# Patient Record
Sex: FEMALE | Race: WHITE | HISPANIC OR LATINO | Employment: FULL TIME | URBAN - METROPOLITAN AREA
[De-identification: names, ages, dates, MRNs, and addresses within clinical notes are randomized per-mention and may not be internally consistent; named-entity substitution may affect disease eponyms.]

---

## 2017-07-05 DIAGNOSIS — F33.1 MAJOR DEPRESSIVE DISORDER, RECURRENT EPISODE, MODERATE (H): Primary | ICD-10-CM

## 2017-07-10 DIAGNOSIS — F33.1 MAJOR DEPRESSIVE DISORDER, RECURRENT EPISODE, MODERATE (H): ICD-10-CM

## 2017-07-10 LAB
ALBUMIN SERPL-MCNC: 3 G/DL (ref 3.4–5)
ALP SERPL-CCNC: 62 U/L (ref 40–150)
ALT SERPL W P-5'-P-CCNC: 20 U/L (ref 0–50)
ANION GAP SERPL CALCULATED.3IONS-SCNC: 8 MMOL/L (ref 3–14)
AST SERPL W P-5'-P-CCNC: 11 U/L (ref 0–45)
BASOPHILS # BLD AUTO: 0 10E9/L (ref 0–0.2)
BASOPHILS NFR BLD AUTO: 0.7 %
BILIRUB SERPL-MCNC: 0.4 MG/DL (ref 0.2–1.3)
BUN SERPL-MCNC: 8 MG/DL (ref 7–30)
CALCIUM SERPL-MCNC: 8.5 MG/DL (ref 8.5–10.1)
CHLORIDE SERPL-SCNC: 107 MMOL/L (ref 94–109)
CHOLEST SERPL-MCNC: 177 MG/DL
CO2 SERPL-SCNC: 24 MMOL/L (ref 20–32)
CREAT SERPL-MCNC: 0.55 MG/DL (ref 0.52–1.04)
DEPRECATED CALCIDIOL+CALCIFEROL SERPL-MC: 16 UG/L (ref 20–75)
DIFFERENTIAL METHOD BLD: NORMAL
EOSINOPHIL # BLD AUTO: 0.1 10E9/L (ref 0–0.7)
EOSINOPHIL NFR BLD AUTO: 2.6 %
ERYTHROCYTE [DISTWIDTH] IN BLOOD BY AUTOMATED COUNT: 13.7 % (ref 10–15)
FERRITIN SERPL-MCNC: 15 NG/ML (ref 12–150)
FOLATE SERPL-MCNC: 18 NG/ML
GFR SERPL CREATININE-BSD FRML MDRD: ABNORMAL ML/MIN/1.7M2
GLUCOSE SERPL-MCNC: 90 MG/DL (ref 70–99)
HBA1C MFR BLD: 5 % (ref 4.3–6)
HCT VFR BLD AUTO: 39.1 % (ref 35–47)
HDLC SERPL-MCNC: 83 MG/DL
HGB BLD-MCNC: 13.2 G/DL (ref 11.7–15.7)
LDLC SERPL CALC-MCNC: 77 MG/DL
LYMPHOCYTES # BLD AUTO: 2.5 10E9/L (ref 0.8–5.3)
LYMPHOCYTES NFR BLD AUTO: 47.2 %
MCH RBC QN AUTO: 28.3 PG (ref 26.5–33)
MCHC RBC AUTO-ENTMCNC: 33.8 G/DL (ref 31.5–36.5)
MCV RBC AUTO: 84 FL (ref 78–100)
MONOCYTES # BLD AUTO: 0.4 10E9/L (ref 0–1.3)
MONOCYTES NFR BLD AUTO: 8.2 %
NEUTROPHILS # BLD AUTO: 2.2 10E9/L (ref 1.6–8.3)
NEUTROPHILS NFR BLD AUTO: 41.3 %
NONHDLC SERPL-MCNC: 94 MG/DL
PLATELET # BLD AUTO: 295 10E9/L (ref 150–450)
POTASSIUM SERPL-SCNC: 4 MMOL/L (ref 3.4–5.3)
PROT SERPL-MCNC: 6.9 G/DL (ref 6.8–8.8)
RBC # BLD AUTO: 4.66 10E12/L (ref 3.8–5.2)
SODIUM SERPL-SCNC: 139 MMOL/L (ref 133–144)
T4 FREE SERPL-MCNC: 1.06 NG/DL (ref 0.76–1.46)
TRIGL SERPL-MCNC: 83 MG/DL
TSH SERPL DL<=0.05 MIU/L-ACNC: 1.79 MU/L (ref 0.4–4)
VIT B12 SERPL-MCNC: 260 PG/ML (ref 193–986)
WBC # BLD AUTO: 5.3 10E9/L (ref 4–11)

## 2017-07-10 PROCEDURE — 84439 ASSAY OF FREE THYROXINE: CPT | Performed by: PSYCHIATRY & NEUROLOGY

## 2017-07-10 PROCEDURE — 83036 HEMOGLOBIN GLYCOSYLATED A1C: CPT | Performed by: PSYCHIATRY & NEUROLOGY

## 2017-07-10 PROCEDURE — 82746 ASSAY OF FOLIC ACID SERUM: CPT | Performed by: PSYCHIATRY & NEUROLOGY

## 2017-07-10 PROCEDURE — 82728 ASSAY OF FERRITIN: CPT | Performed by: PSYCHIATRY & NEUROLOGY

## 2017-07-10 PROCEDURE — 82306 VITAMIN D 25 HYDROXY: CPT | Performed by: PSYCHIATRY & NEUROLOGY

## 2017-07-10 PROCEDURE — 80050 GENERAL HEALTH PANEL: CPT | Performed by: PSYCHIATRY & NEUROLOGY

## 2017-07-10 PROCEDURE — 82607 VITAMIN B-12: CPT | Performed by: PSYCHIATRY & NEUROLOGY

## 2017-07-10 PROCEDURE — 36415 COLL VENOUS BLD VENIPUNCTURE: CPT | Performed by: PSYCHIATRY & NEUROLOGY

## 2017-07-10 PROCEDURE — 80061 LIPID PANEL: CPT | Performed by: PSYCHIATRY & NEUROLOGY

## 2017-07-25 DIAGNOSIS — N92.6 IRREGULAR MENSES: ICD-10-CM

## 2017-07-26 RX ORDER — DROSPIRENONE AND ETHINYL ESTRADIOL TABLETS 0.02-3(28)
KIT ORAL
Qty: 28 TABLET | Refills: 0 | Status: SHIPPED | OUTPATIENT
Start: 2017-07-26 | End: 2017-08-03

## 2017-07-26 NOTE — TELEPHONE ENCOUNTER
LORYNA 3-0.02 MG per tablet  Last Written Prescription Date: 7/15/2016  Last Fill Quantity: 84, # refills: 4  Last Office Visit with G, P or [facility] prescribing provider: 7/15/2016       BP Readings from Last 3 Encounters:   07/15/16 125/80   09/17/10 137/78   06/08/10 145/76     Date of last Breast Exam: patient < 50 years of age.     30 day irineo refill. Called and left patient a message indicating for them to call and schedule an annual office visit. Eileen Hart RN

## 2017-08-03 ENCOUNTER — OFFICE VISIT (OUTPATIENT)
Dept: PEDIATRICS | Facility: CLINIC | Age: 22
End: 2017-08-03
Payer: COMMERCIAL

## 2017-08-03 VITALS
OXYGEN SATURATION: 100 % | WEIGHT: 249.3 LBS | HEART RATE: 95 BPM | BODY MASS INDEX: 41.54 KG/M2 | TEMPERATURE: 98 F | HEIGHT: 65 IN | DIASTOLIC BLOOD PRESSURE: 78 MMHG | SYSTOLIC BLOOD PRESSURE: 132 MMHG

## 2017-08-03 DIAGNOSIS — Z12.4 SCREENING FOR MALIGNANT NEOPLASM OF CERVIX: ICD-10-CM

## 2017-08-03 DIAGNOSIS — N92.6 IRREGULAR MENSES: ICD-10-CM

## 2017-08-03 DIAGNOSIS — E66.9 OBESITY, UNSPECIFIED OBESITY SEVERITY, UNSPECIFIED OBESITY TYPE: ICD-10-CM

## 2017-08-03 DIAGNOSIS — Z00.00 ENCOUNTER FOR ROUTINE ADULT HEALTH EXAMINATION WITHOUT ABNORMAL FINDINGS: Primary | ICD-10-CM

## 2017-08-03 PROCEDURE — G0145 SCR C/V CYTO,THINLAYER,RESCR: HCPCS | Performed by: NURSE PRACTITIONER

## 2017-08-03 PROCEDURE — 99395 PREV VISIT EST AGE 18-39: CPT | Performed by: NURSE PRACTITIONER

## 2017-08-03 RX ORDER — ESCITALOPRAM OXALATE 5 MG/1
5 TABLET ORAL DAILY
COMMUNITY
End: 2018-08-01

## 2017-08-03 RX ORDER — DROSPIRENONE AND ETHINYL ESTRADIOL TABLETS 0.02-3(28)
1 KIT ORAL DAILY
Qty: 84 TABLET | Refills: 4 | Status: SHIPPED | OUTPATIENT
Start: 2017-08-03 | End: 2018-08-19

## 2017-08-03 NOTE — MR AVS SNAPSHOT
After Visit Summary   8/3/2017    Bisi Prasad    MRN: [id number]           Patient Information     Date Of Birth          1995        Visit Information        Provider Department      8/3/2017 4:20 PM Veda Cordoba APRN CNP [facility]        Today's Diagnoses     Encounter for routine adult health examination without abnormal findings        Screening for malignant neoplasm of cervix        Irregular menses          Care Instructions    PLAN:   1.   Symptomatic therapy suggested: Continue current medications.    2.  Orders Placed This Encounter   Medications     escitalopram (LEXAPRO) 5 MG tablet     Sig: Take 5 mg by mouth daily     LORYNA 3-0.02 MG per tablet     Sig: Take 1 tablet by mouth daily     Dispense:  84 tablet     Refill:  4     Orders Placed This Encounter   Procedures     Pap imaged thin layer screen only - recommended age 21 - 24 years       3. Patient needs to follow up in if no improvement,or sooner if worsening of symptoms or other symptoms develop.  Will follow up and/or notify patient of  results via My Chart to determine further need for followup  Follow up office visit in one year for annual health maintenance exam, sooner PRN.    Preventive Health Recommendations  Female Ages 18 to 25     Yearly exam:     See your health care provider every year in order to  o Review health changes.   o Discuss preventive care.    o Review your medicines if your doctor has prescribed any.      You should be tested each year for STDs (sexually transmitted diseases).       After age 20, talk to your provider about how often you should have cholesterol testing.      Starting at age 21, get a Pap test every three years. If you have an abnormal result, your doctor may have you test more often.      If you are at risk for diabetes, you should have a diabetes test (fasting glucose).     Shots:     Get a flu shot each year.     Get a tetanus shot every 10 years.      Consider getting the shot (vaccine) that prevents cervical cancer (Gardasil).    Nutrition:     Eat at least 5 servings of fruits and vegetables each day.    Eat whole-grain bread, whole-wheat pasta and brown rice instead of white grains and rice.    Talk to your provider about Calcium and Vitamin D.     Lifestyle    Exercise at least 150 minutes a week each week (30 minutes a day, 5 days a week). This will help you control your weight and prevent disease.    Limit alcohol to one drink per day.    No smoking.     Wear sunscreen to prevent skin cancer.    See your dentist every six months for an exam and cleaning.  It was a pleasure seeing you today at the [facility]. Thank you for allowing us to care for you today. We truly hope we provided you with the excellent service you deserve. Please let us know if there is anything else we can do for you so we can be sure you are leaving completley satisfied with your care experience.       General information about your clinic   Clinic Hours Lab Hours (Appointments are required)   Mon-Thurs: 7:30 AM - 7 PM Mon-Thurs: 7:30 AM - 7 PM   Fri: 7:30 AM - 5 PM Fri: 7:30 AM - 5 PM        After Hours Nurse Advise & Appts:  [facility] Nurse Advisors: [phone]  [facility] On Call: to make appointments anytime: [phone] On Call Physician: call [phone] and answering service will page the on call physician.        For urgent appointments, please call [phone] and ask for the triage nurse or your care team clinic nurse.  How to contact my care team:  [facility]: [email or web address]   Phone: [phone]   Fax: [phone]       [facility]:   Phone: [phone]  Hours: 8:00 AM - 6:00 PM  Medication Refills:  Call your pharmacy and they will forward the refill to us. Please allow 3 business days for your refills to be completed.       Normal or non-critical lab and imaging results will be communicated to you by MyChart, letter or  phone within 7 days.  If you do not hear from us within 10 days, please call the clinic. If you have a critical or abnormal lab result, we will notify you by phone as soon as possible.       We now have PWIC (Pediatric Walk in Care)  Monday-Friday from 7:30-4. Simply walk in and be seen for your urgent needs like cough, fever, rash, diarrhea or vomiting, pink eye, UTI. No appointments needed. Ask one of the team for more information      -Your Care Team:    Dr. Yuan Bell - Internal Medicine  Dr. Nitin Enriquez - Internal Medicine/Pediatrics   Dr. Azucena Woodson - Family Medicine  Dr. Amanda Whitaker - Pediatrics  Dr. Ana Doan - Pediatrics  Veda Cordoba CNP - Family Practice Nurse Practitioner            Follow-ups after your visit        Who to contact     If you have questions or need follow up information about today's clinic visit or your schedule please contact [facility] directly at [phone].  Normal or non-critical lab and imaging results will be communicated to you by Code71hart, letter or phone within 4 business days after the clinic has received the results. If you do not hear from us within 7 days, please contact the clinic through Code71hart or phone. If you have a critical or abnormal lab result, we will notify you by phone as soon as possible.  Submit refill requests through [employer] or call your pharmacy and they will forward the refill request to us. Please allow 3 business days for your refill to be completed.          Additional Information About Your Visit        Code71hart Information     [employer] gives you secure access to your electronic health record. If you see a primary care provider, you can also send messages to your care team and make appointments. If you have questions, please call your primary care clinic.  If you do not have a primary care provider, please call [phone] and they will assist you.      [employer] is an electronic gateway that provides easy, online access to  "your medical records. With Nutshell, you can request a clinic appointment, read your test results, renew a prescription or communicate with your care team.     To access your existing account, please contact your [facility] or call [phone] for assistance.        Care EveryWhere ID     This is your Care EveryWhere ID. This could be used by other organizations to access your [facility] medical records  [id number]        Your Vitals Were     Pulse Temperature Height Last Period Pulse Oximetry BMI (Body Mass Index)    95 98  F (36.7  C) (Temporal) 5' 4.5\" (1.638 m) 07/16/2017 100% 42.13 kg/m2       Blood Pressure from Last 3 Encounters:   08/03/17 132/78   07/15/16 125/80   09/17/10 137/78    Weight from Last 3 Encounters:   08/03/17 249 lb 4.8 oz (113.1 kg)   07/15/16 264 lb 11.2 oz (120.1 kg)   06/08/10 239 lb 3.2 oz (108.5 kg) (>99 %)*     * Growth percentiles are based on CDC 2-20 Years data.              We Performed the Following     Pap imaged thin layer screen only - recommended age 21 - 24 years          Today's Medication Changes          These changes are accurate as of: 8/3/17  5:01 PM.  If you have any questions, ask your nurse or doctor.               These medicines have changed or have updated prescriptions.        Dose/Directions    LORYNA 3-0.02 MG per tablet   This may have changed:  See the new instructions.   Used for:  Irregular menses   Generic drug:  drospirenone-ethinyl estradiol   Changed by:  Veda Cordoba APRN CNP        Dose:  1 tablet   Take 1 tablet by mouth daily   Quantity:  84 tablet   Refills:  4            Where to get your medicines      These medications were sent to [place], [place] - [facility] [address] [address]     Phone:  [phone]     LORYNA 3-0.02 MG per tablet                Primary Care Provider Office Phone # Fax #    WEI Vazquez [phone] " [phone]       [facility] [address]  [place] [address]        Equal Access to Services     YOKASTA CORRIGAN : Hadii aad ku hadmacario Toro, wacicida luqfelicia, qanuhata kaalmada marissa, susan duranedward robin. So [facility] [phone].    ATENCIÓN: Si habla español, tiene a phan disposición servicios gratuitos de asistencia lingüística. Llame al [phone].    We comply with applicable federal civil rights laws and [place] laws. We do not discriminate on the basis of race, color, national origin, age, disability sex, sexual orientation or gender identity.            Thank you!     Thank you for choosing [facility]  for your care. Our goal is always to provide you with excellent care. Hearing back from our patients is one way we can continue to improve our services. Please take a few minutes to complete the written survey that you may receive in the mail after your visit with us. Thank you!             Your Updated Medication List - Protect others around you: Learn how to safely use, store and throw away your medicines at www.disposemymeds.org.          This list is accurate as of: 8/3/17  5:01 PM.  Always use your most recent med list.                   Brand Name Dispense Instructions for use Diagnosis    escitalopram 5 MG tablet    LEXAPRO     Take 5 mg by mouth daily        LORYNA 3-0.02 MG per tablet   Generic drug:  drospirenone-ethinyl estradiol     84 tablet    Take 1 tablet by mouth daily    Irregular menses

## 2017-08-03 NOTE — PATIENT INSTRUCTIONS
PLAN:   1.   Symptomatic therapy suggested: Continue current medications.    2.  Orders Placed This Encounter   Medications     escitalopram (LEXAPRO) 5 MG tablet     Sig: Take 5 mg by mouth daily     LORYNA 3-0.02 MG per tablet     Sig: Take 1 tablet by mouth daily     Dispense:  84 tablet     Refill:  4     Orders Placed This Encounter   Procedures     Pap imaged thin layer screen only - recommended age 21 - 24 years       3. Patient needs to follow up in if no improvement,or sooner if worsening of symptoms or other symptoms develop.  Will follow up and/or notify patient of  results via My Chart to determine further need for followup  Follow up office visit in one year for annual health maintenance exam, sooner PRN.    Preventive Health Recommendations  Female Ages 18 to 25     Yearly exam:     See your health care provider every year in order to  o Review health changes.   o Discuss preventive care.    o Review your medicines if your doctor has prescribed any.      You should be tested each year for STDs (sexually transmitted diseases).       After age 20, talk to your provider about how often you should have cholesterol testing.      Starting at age 21, get a Pap test every three years. If you have an abnormal result, your doctor may have you test more often.      If you are at risk for diabetes, you should have a diabetes test (fasting glucose).     Shots:     Get a flu shot each year.     Get a tetanus shot every 10 years.     Consider getting the shot (vaccine) that prevents cervical cancer (Gardasil).    Nutrition:     Eat at least 5 servings of fruits and vegetables each day.    Eat whole-grain bread, whole-wheat pasta and brown rice instead of white grains and rice.    Talk to your provider about Calcium and Vitamin D.     Lifestyle    Exercise at least 150 minutes a week each week (30 minutes a day, 5 days a week). This will help you control your weight and prevent disease.    Limit alcohol to one drink  per day.    No smoking.     Wear sunscreen to prevent skin cancer.    See your dentist every six months for an exam and cleaning.  It was a pleasure seeing you today at the [facility]. Thank you for allowing us to care for you today. We truly hope we provided you with the excellent service you deserve. Please let us know if there is anything else we can do for you so we can be sure you are leaving completley satisfied with your care experience.       General information about your clinic   Clinic Hours Lab Hours (Appointments are required)   Mon-Thurs: 7:30 AM - 7 PM Mon-Thurs: 7:30 AM - 7 PM   Fri: 7:30 AM - 5 PM Fri: 7:30 AM - 5 PM        After Hours Nurse Advise & Appts:  [facility] Nurse Advisors: [phone]  [facility] On Call: to make appointments anytime: [phone] On Call Physician: call [phone] and answering service will page the on call physician.        For urgent appointments, please call [phone] and ask for the triage nurse or your care team clinic nurse.  How to contact my care team:  MyChart: [email or web address]   Phone: [phone]   Fax: [phone]       [facility]:   Phone: [phone]  Hours: 8:00 AM - 6:00 PM  Medication Refills:  Call your pharmacy and they will forward the refill to us. Please allow 3 business days for your refills to be completed.       Normal or non-critical lab and imaging results will be communicated to you by MyChart, letter or phone within 7 days.  If you do not hear from us within 10 days, please call the clinic. If you have a critical or abnormal lab result, we will notify you by phone as soon as possible.       We now have PWIC (Pediatric Walk in Care)  Monday-Friday from 7:30-4. Simply walk in and be seen for your urgent needs like cough, fever, rash, diarrhea or vomiting, pink eye, UTI. No appointments needed. Ask one of the team for more information      -Your Care Team:    Dr. Yuan Bell - Internal  Medicine  Dr. Nitin Enriquez - Internal Medicine/Pediatrics   Dr. Azucena Woodson - Family Medicine  Dr. Amanda Whitaker - Pediatrics  Dr. Ana Doan - Pediatrics  Veda Cordoba CNP - Family Practice Nurse Practitioner

## 2017-08-03 NOTE — NURSING NOTE
"Chief Complaint   Patient presents with     Physical       Initial /78  Pulse 95  Temp 98  F (36.7  C) (Temporal)  Ht 5' 4.5\" (1.638 m)  Wt 249 lb 4.8 oz (113.1 kg)  LMP 07/16/2017  SpO2 100%  BMI 42.13 kg/m2 Estimated body mass index is 42.13 kg/(m^2) as calculated from the following:    Height as of this encounter: 5' 4.5\" (1.638 m).    Weight as of this encounter: 249 lb 4.8 oz (113.1 kg).  Medication Reconciliation: complete     Julisa Cherry CMA  "
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
## 2017-08-07 LAB
COPATH REPORT: NORMAL
PAP: NORMAL

## 2018-08-01 ENCOUNTER — OFFICE VISIT (OUTPATIENT)
Dept: FAMILY MEDICINE | Facility: CLINIC | Age: 23
End: 2018-08-01
Payer: COMMERCIAL

## 2018-08-01 VITALS
DIASTOLIC BLOOD PRESSURE: 90 MMHG | OXYGEN SATURATION: 99 % | RESPIRATION RATE: 17 BRPM | SYSTOLIC BLOOD PRESSURE: 137 MMHG | TEMPERATURE: 98.4 F | HEIGHT: 65 IN | BODY MASS INDEX: 44.28 KG/M2 | HEART RATE: 77 BPM | WEIGHT: 265.8 LBS

## 2018-08-01 DIAGNOSIS — E66.01 MORBID OBESITY (H): Primary | ICD-10-CM

## 2018-08-01 DIAGNOSIS — F33.40 RECURRENT MAJOR DEPRESSIVE DISORDER, IN REMISSION (H): ICD-10-CM

## 2018-08-01 DIAGNOSIS — F43.10 PTSD (POST-TRAUMATIC STRESS DISORDER): ICD-10-CM

## 2018-08-01 DIAGNOSIS — F41.1 GAD (GENERALIZED ANXIETY DISORDER): ICD-10-CM

## 2018-08-01 PROCEDURE — 99214 OFFICE O/P EST MOD 30 MIN: CPT | Performed by: FAMILY MEDICINE

## 2018-08-01 RX ORDER — PHENTERMINE HYDROCHLORIDE 15 MG/1
15 CAPSULE ORAL EVERY MORNING
Qty: 30 CAPSULE | Refills: 0 | Status: SHIPPED | OUTPATIENT
Start: 2018-08-01 | End: 2018-10-12

## 2018-08-01 RX ORDER — FAMOTIDINE 20 MG
TABLET ORAL
COMMUNITY

## 2018-08-01 RX ORDER — IBUPROFEN 200 MG
200-400 TABLET ORAL
COMMUNITY
End: 2018-08-01

## 2018-08-01 RX ORDER — BUSPIRONE HYDROCHLORIDE 5 MG/1
TABLET ORAL
Refills: 1 | COMMUNITY
Start: 2018-05-30

## 2018-08-01 RX ORDER — IBUPROFEN 800 MG/1
TABLET, FILM COATED ORAL
Refills: 0 | COMMUNITY
Start: 2018-07-26 | End: 2018-10-12

## 2018-08-01 RX ORDER — ESCITALOPRAM OXALATE 20 MG/1
20 TABLET ORAL DAILY
Refills: 0 | COMMUNITY
Start: 2018-05-02 | End: 2022-01-11

## 2018-08-01 RX ORDER — IBUPROFEN 800 MG/1
800 TABLET, FILM COATED ORAL
COMMUNITY
Start: 2018-04-16 | End: 2018-08-01

## 2018-08-01 RX ORDER — SODIUM FLUORIDE 1.1 G/100G
CREAM ORAL SEE ADMIN INSTRUCTIONS
Refills: 2 | COMMUNITY
Start: 2018-06-05

## 2018-08-01 RX ORDER — CHLORHEXIDINE GLUCONATE ORAL RINSE 1.2 MG/ML
SOLUTION DENTAL
Refills: 0 | COMMUNITY
Start: 2018-07-26 | End: 2018-10-12

## 2018-08-01 NOTE — MR AVS SNAPSHOT
After Visit Summary   8/1/2018    Bisi Prasad    MRN: [id number]           Patient Information     Date Of Birth          1995        Visit Information        Provider Department      8/1/2018 2:00 PM Florecita Silva DO [facility]        Today's Diagnoses     Morbid obesity (H)    -  1    BERTHA (generalized anxiety disorder)        Recurrent major depressive disorder, in remission (H)        PTSD (post-traumatic stress disorder)          Care Instructions    Vyvanse - FDA for binge eating disorder  Stimulant     Contrave - Wellbutrin/naltrexone    Qsymia - Topamax/phentermine    phentermine          Follow-ups after your visit        Future tests that were ordered for you today     Open Future Orders        Priority Expected Expires Ordered    Lipid panel reflex to direct LDL Fasting Routine  12/1/2018 8/1/2018    Tissue transglutaminase cyndy IgA and IgG Routine  12/1/2018 8/1/2018    Vitamin B12 Routine  12/1/2018 8/1/2018    Vitamin D Deficiency Routine  12/1/2018 8/1/2018    Ferritin Routine  12/1/2018 8/1/2018    Comprehensive metabolic panel (BMP + Alb, Alk Phos, ALT, AST, Total. Bili, TP) Routine  12/1/2018 8/1/2018    TSH with free T4 reflex Routine  12/1/2018 8/1/2018            Who to contact     If you have questions or need follow up information about today's clinic visit or your schedule please contact [facility] directly at [phone].  Normal or non-critical lab and imaging results will be communicated to you by MyChart, letter or phone within 4 business days after the clinic has received the results. If you do not hear from us within 7 days, please contact the clinic through MyChart or phone. If you have a critical or abnormal lab result, we will notify you by phone as soon as possible.  Submit refill requests through [employer] or call your pharmacy and they will forward the refill request to us. Please allow 3 business days for your refill to be  "completed.          Additional Information About Your Visit        [employer] Information     [employer] gives you secure access to your electronic health record. If you see a primary care provider, you can also send messages to your care team and make appointments. If you have questions, please call your primary care clinic.  If you do not have a primary care provider, please call [phone] and they will assist you.        Care EveryWhere ID     This is your Care EveryWhere ID. This could be used by other organizations to access your [facility] medical records  [id number]        Your Vitals Were     Pulse Temperature Respirations Height Pulse Oximetry Breastfeeding?    77 98.4  F (36.9  C) (Oral) 17 5' 4.5\" (1.638 m) 99% No    BMI (Body Mass Index)                   44.92 kg/m2            Blood Pressure from Last 3 Encounters:   08/01/18 137/90   08/03/17 132/78   07/15/16 125/80    Weight from Last 3 Encounters:   08/01/18 265 lb 12.8 oz (120.6 kg)   08/03/17 249 lb 4.8 oz (113.1 kg)   07/15/16 264 lb 11.2 oz (120.1 kg)                 Today's Medication Changes          These changes are accurate as of 8/1/18  2:47 PM.  If you have any questions, ask your nurse or doctor.               Start taking these medicines.        Dose/Directions    phentermine 15 MG capsule   Used for:  Morbid obesity (H)   Started by:  Florecita Silva DO        Dose:  15 mg   Take 1 capsule (15 mg) by mouth every morning   Quantity:  30 capsule   Refills:  0            Where to get your medicines      Some of these will need a paper prescription and others can be bought over the counter.  Ask your nurse if you have questions.     Bring a paper prescription for each of these medications     phentermine 15 MG capsule                Primary Care Provider Office Phone # Fax #    Florecita Silva [phone] [address] [address]        Equal Access to Services     YOKASTA CORRIGAN AH: Alexanderii cirilo burden " obi Toro, maribelda lukanuadaha, qanuhata kahenryda marissa, susan duranedward robin. So [facility] [phone].    ATENCIÓN: Si habla amelia, tiene a phan disposición servicios gratuitos de asistencia lingüística. Collin al [phone].    We comply with applicable federal civil rights laws and [place] laws. We do not discriminate on the basis of race, color, national origin, age, disability, sex, sexual orientation, or gender identity.            Thank you!     Thank you for choosing [facility]  for your care. Our goal is always to provide you with excellent care. Hearing back from our patients is one way we can continue to improve our services. Please take a few minutes to complete the written survey that you may receive in the mail after your visit with us. Thank you!             Your Updated Medication List - Protect others around you: Learn how to safely use, store and throw away your medicines at www.disposemymeds.org.          This list is accurate as of 8/1/18  2:47 PM.  Always use your most recent med list.                   Brand Name Dispense Instructions for use Diagnosis    busPIRone 5 MG tablet    BUSPAR     TAKE 1 TABLET BY MOUTH TWICE A DAY        chlorhexidine 0.12 % solution    PERIDEX     RINSE WITH 1/2OZ TWICE DAY FOR 30 SECONDS & EXPECTORATE, AVOID RINSING OR EATING FOR 30 MIN        DENTA 5000 PLUS 1.1 % Crea   Generic drug:  Sodium Fluoride      See Admin Instructions        escitalopram 20 MG tablet    LEXAPRO     Take 20 mg by mouth daily        ibuprofen 800 MG tablet    ADVIL/MOTRIN     TAKE 1 TABLET BY MOUTH EVERY 6 HOURS AS NEEDED FOR PAIN        LORYNA 3-0.02 MG per tablet   Generic drug:  drospirenone-ethinyl estradiol     84 tablet    Take 1 tablet by mouth daily    Irregular menses       phentermine 15 MG capsule     30 capsule    Take 1 capsule (15 mg) by mouth every morning    Morbid obesity (H)       Vitamin D (Cholecalciferol) 1000 units Caps

## 2018-08-01 NOTE — PROGRESS NOTES
SUBJECTIVE:   Bisi Prasad is a 22 year old female who presents to clinic today for the following health issues:  Pt is new to clinic and would like to discuss the following:  Obesity - pt is interested in loosing weight and lowering her cardiac risk factors.  Her biological father had CAD in his 40's.   Recently she has been working out up to 4 times per week  Has been seeing nutritionist - [facility] in [place]  Weight - highest - around 270-280 pounds  Lowest weight was 240 -    When in HS - [employer] kept her very active and didn't worry too much but has always been over weight.   Feels better exercising with mood   history of binging - working with therapist and nutritionist     M aunt with celiac    Pt has a hx of BERTHA and depression   She does see psychiatrist at [facility] - Dr. Medrano    -------------------------------------    Problem list and histories reviewed & adjusted, as indicated.  Additional history: as documented    Patient Active Problem List   Diagnosis     Rosacea     KP (keratosis pilaris)     CARDIOVASCULAR SCREENING; LDL GOAL LESS THAN 130     Obesity, unspecified obesity severity, unspecified obesity type     BERTHA (generalized anxiety disorder)     Recurrent major depressive disorder, in remission (H)     PTSD (post-traumatic stress disorder)     Past Surgical History:   Procedure Laterality Date     NO HISTORY OF SURGERY         Social History   Substance Use Topics     Smoking status: Never Smoker     Smokeless tobacco: Never Used     Alcohol use Yes      Comment: less than once a week      Family History   Problem Relation Age of Onset     Hypertension Mother      Obesity Mother      Cardiovascular Father      Depression Father      Obesity Father      Depression Brother      Depression Sister      Breast Cancer No family hx of      Cancer - colorectal No family hx of      Prostate Cancer No family hx of      Diabetes No family hx of      Coronary Artery  "Disease No family hx of      Hyperlipidemia No family hx of      Cerebrovascular Disease No family hx of      Colon Cancer No family hx of      Thyroid Disease No family hx of      Genetic Disorder No family hx of            Reviewed and updated as needed this visit by clinical staff  Tobacco  Allergies  Meds  Problems  Med Hx  Surg Hx  Fam Hx  Soc Hx        Reviewed and updated as needed this visit by Provider  Allergies  Meds  Problems         ROS:  Constitutional, HEENT, cardiovascular, pulmonary, GI, , musculoskeletal, neuro, skin, endocrine and psych systems are negative, except as otherwise noted.    OBJECTIVE:     /90  Pulse 77  Temp 98.4  F (36.9  C) (Oral)  Resp 17  Ht 5' 4.5\" (1.638 m)  Wt 265 lb 12.8 oz (120.6 kg)  SpO2 99%  Breastfeeding? No  BMI 44.92 kg/m2  Body mass index is 44.92 kg/(m^2).  GENERAL: alert, no distress and obese  RESP: lungs clear to auscultation - no rales, rhonchi or wheezes  CV: regular rate and rhythm, normal S1 S2, no S3 or S4, no murmur, click or rub, no peripheral edema and peripheral pulses strong    Diagnostic Test Results:  Ordered as future - will check fasting labs    ASSESSMENT/PLAN:     1. BERTHA (generalized anxiety disorder)     - Tissue transglutaminase cyndy IgA and IgG; Future  - Vitamin B12; Future  - Vitamin D Deficiency; Future  - Ferritin; Future  - Comprehensive metabolic panel (BMP + Alb, Alk Phos, ALT, AST, Total. Bili, TP); Future  - TSH with free T4 reflex; Future    2. Recurrent major depressive disorder, in remission (H)     - Tissue transglutaminase cyndy IgA and IgG; Future  - Vitamin B12; Future  - Vitamin D Deficiency; Future  - Ferritin; Future  - Comprehensive metabolic panel (BMP + Alb, Alk Phos, ALT, AST, Total. Bili, TP); Future  - TSH with free T4 reflex; Future    3. PTSD (post-traumatic stress disorder)     - Tissue transglutaminase cyndy IgA and IgG; Future  - Vitamin B12; Future  - Vitamin D Deficiency; Future  - Ferritin; " Future  - Comprehensive metabolic panel (BMP + Alb, Alk Phos, ALT, AST, Total. Bili, TP); Future  - TSH with free T4 reflex; Future    4. Morbid obesity (H)  Pt with long hx of morbid obesity -   She does see psychiatry for anxiety and depression  We reviewed all the weight loss drugs availabe and how they work.  She does have hx of binge eating disorder so wonder about Vyvanse.  Could consider this but asked that she review with her psychiatrist since many meds overlap and I don't want to exacerbate her anxiety.  She will try phentermine for one month - very low dose  F/u one month for weight recheck and physical  - Lipid panel reflex to direct LDL Fasting; Future  - Tissue transglutaminase cyndy IgA and IgG; Future  - Vitamin B12; Future  - Vitamin D Deficiency; Future  - Ferritin; Future  - Comprehensive metabolic panel (BMP + Alb, Alk Phos, ALT, AST, Total. Bili, TP); Future  - TSH with free T4 reflex; Future  - phentermine 15 MG capsule; Take 1 capsule (15 mg) by mouth every morning  Dispense: 30 capsule; Refill: 0    I spent over 25 minutes with patient and over 50% time in counseling regarding above issues.     Florecita Silva, DO  [facility]

## 2018-08-01 NOTE — PATIENT INSTRUCTIONS
Vyvanse - FDA for binge eating disorder  Stimulant     Contrave - Wellbutrin/naltrexone    Qsymia - Topamax/phentermine    phentermine

## 2018-08-07 DIAGNOSIS — F41.1 GAD (GENERALIZED ANXIETY DISORDER): ICD-10-CM

## 2018-08-07 DIAGNOSIS — F43.10 PTSD (POST-TRAUMATIC STRESS DISORDER): ICD-10-CM

## 2018-08-07 DIAGNOSIS — E66.01 MORBID OBESITY (H): ICD-10-CM

## 2018-08-07 DIAGNOSIS — F33.40 RECURRENT MAJOR DEPRESSIVE DISORDER, IN REMISSION (H): ICD-10-CM

## 2018-08-07 LAB
ALBUMIN SERPL-MCNC: 3.2 G/DL (ref 3.4–5)
ALP SERPL-CCNC: 56 U/L (ref 40–150)
ALT SERPL W P-5'-P-CCNC: 18 U/L (ref 0–50)
ANION GAP SERPL CALCULATED.3IONS-SCNC: 13 MMOL/L (ref 3–14)
AST SERPL W P-5'-P-CCNC: 12 U/L (ref 0–45)
BILIRUB SERPL-MCNC: 0.3 MG/DL (ref 0.2–1.3)
BUN SERPL-MCNC: 13 MG/DL (ref 7–30)
CALCIUM SERPL-MCNC: 8.4 MG/DL (ref 8.5–10.1)
CHLORIDE SERPL-SCNC: 105 MMOL/L (ref 94–109)
CHOLEST SERPL-MCNC: 176 MG/DL
CO2 SERPL-SCNC: 21 MMOL/L (ref 20–32)
CREAT SERPL-MCNC: 0.6 MG/DL (ref 0.52–1.04)
FERRITIN SERPL-MCNC: 50 NG/ML (ref 12–150)
GFR SERPL CREATININE-BSD FRML MDRD: >90 ML/MIN/1.7M2
GLUCOSE SERPL-MCNC: 90 MG/DL (ref 70–99)
HDLC SERPL-MCNC: 74 MG/DL
LDLC SERPL CALC-MCNC: 72 MG/DL
NONHDLC SERPL-MCNC: 102 MG/DL
POTASSIUM SERPL-SCNC: 3.8 MMOL/L (ref 3.4–5.3)
PROT SERPL-MCNC: 7 G/DL (ref 6.8–8.8)
SODIUM SERPL-SCNC: 139 MMOL/L (ref 133–144)
TRIGL SERPL-MCNC: 148 MG/DL
TSH SERPL DL<=0.005 MIU/L-ACNC: 1.23 MU/L (ref 0.4–4)
VIT B12 SERPL-MCNC: 321 PG/ML (ref 193–986)

## 2018-08-07 PROCEDURE — 80061 LIPID PANEL: CPT | Performed by: FAMILY MEDICINE

## 2018-08-07 PROCEDURE — 82306 VITAMIN D 25 HYDROXY: CPT | Performed by: FAMILY MEDICINE

## 2018-08-07 PROCEDURE — 83516 IMMUNOASSAY NONANTIBODY: CPT | Performed by: FAMILY MEDICINE

## 2018-08-07 PROCEDURE — 80053 COMPREHEN METABOLIC PANEL: CPT | Performed by: FAMILY MEDICINE

## 2018-08-07 PROCEDURE — 82728 ASSAY OF FERRITIN: CPT | Performed by: FAMILY MEDICINE

## 2018-08-07 PROCEDURE — 84443 ASSAY THYROID STIM HORMONE: CPT | Performed by: FAMILY MEDICINE

## 2018-08-07 PROCEDURE — 82607 VITAMIN B-12: CPT | Performed by: FAMILY MEDICINE

## 2018-08-07 PROCEDURE — 36415 COLL VENOUS BLD VENIPUNCTURE: CPT | Performed by: FAMILY MEDICINE

## 2018-08-07 PROCEDURE — 83516 IMMUNOASSAY NONANTIBODY: CPT | Mod: 59 | Performed by: FAMILY MEDICINE

## 2018-08-08 LAB
DEPRECATED CALCIDIOL+CALCIFEROL SERPL-MC: 34 UG/L (ref 20–75)
TTG IGA SER-ACNC: <1 U/ML
TTG IGG SER-ACNC: <1 U/ML

## 2018-08-10 NOTE — PROGRESS NOTES
Deanicole Mathews,   Your test results are all back -   -All of your labs are normal.  Let us know if you have any questions.  -Florecita Silva, DO

## 2018-08-19 DIAGNOSIS — N92.6 IRREGULAR MENSES: ICD-10-CM

## 2018-08-19 NOTE — LETTER
August 20, 2018      Bisi Prasad  [address] [facility] [address]              Dear Bisi,      We recently received a call from your pharmacy requesting a refill of your medication. We have provided a 30 day refill of your medication, LORYNA 3-0.02 MG per tablet, as requested.      A review of your chart indicates that your last office visit was on 8/3/2017 with Veda Cordoba CNP.  An appointment is required yearly with your provider.    We have authorized a one time refill of your medication to allow time for you to schedule.     If you have a history of diabetes or high cholesterol, please come in fasting for the appointment. Fasting entails nothing to eat or drink 8 hours prior to your appointment; with the exception on water. You may take your medication the day of the appointment.    Please call the clinic to schedule your appointment.    Thank you for taking an active role in your healthcare.      Sincerely,    [facility]

## 2018-08-20 RX ORDER — DROSPIRENONE AND ETHINYL ESTRADIOL TABLETS 0.02-3(28)
KIT ORAL
Qty: 28 TABLET | Refills: 0 | Status: SHIPPED | OUTPATIENT
Start: 2018-08-20 | End: 2018-09-15

## 2018-08-20 NOTE — TELEPHONE ENCOUNTER
LORYNA 3-0.02 MG per tablet 84 tablet 4 8/3/2017  Yes   Sig - Route: Take 1 tablet by mouth daily - Oral     Last OV with Veda Cordoba CNP: 8/3/2017    No future apts.     Contraceptives Protocol Passed8/19 1:27 AM   Patient is not a current smoker if age is 35 or older    Recent (12 mo) or future (30 days) visit within the authorizing provider's specialty    No active pregnancy on record    No positive pregnancy test in past 12 months     Patient due for an annual physical. Dorothy refill. Patient notified by letter.     Eileen Hart RN

## 2018-09-15 DIAGNOSIS — N92.6 IRREGULAR MENSES: ICD-10-CM

## 2018-09-17 NOTE — TELEPHONE ENCOUNTER
GIANVI 3-0.02 MG per tablet  LORYNA 3-0.02 MG per tablet 28 tablet 0 8/20/2018  Yes   Sig: TAKE 1 TABLET BY MOUTH EVERY DAY **MUST BE LORYNA**   Class: E-Prescribe   Notes to Pharmacy: Patient due for an annual physical. Dorothy refill. Patient notified by letter.     Routing to patients new PCP Dr. Silva to please review.     Contraceptives Protocol Passed9/15 1:16 AM   Patient is not a current smoker if age is 35 or older    Recent (12 mo) or future (30 days) visit within the authorizing provider's specialty    No active pregnancy on record    No positive pregnancy test in past 12 months     Eileen Hart RN

## 2018-09-18 RX ORDER — DROSPIRENONE AND ETHINYL ESTRADIOL 0.02-3(28)
KIT ORAL
Qty: 84 TABLET | Refills: 0 | Status: SHIPPED | OUTPATIENT
Start: 2018-09-18 | End: 2018-10-31

## 2018-09-20 DIAGNOSIS — N92.6 IRREGULAR MENSES: ICD-10-CM

## 2018-09-20 RX ORDER — DROSPIRENONE AND ETHINYL ESTRADIOL 0.02-3(28)
KIT ORAL
Qty: 28 TABLET | Refills: 0 | OUTPATIENT
Start: 2018-09-20

## 2018-09-20 NOTE — TELEPHONE ENCOUNTER
GIANVI 3-0.02 MG per tablet 84 tablet 0 9/18/2018  Yes   Sig: TAKE 1 TABLET BY MOUTH EVERY DAY   Class: E-Prescribe       Contraceptives Protocol Passed9/20 10:27 AM   Patient is not a current smoker if age is 35 or older    Recent (12 mo) or future (30 days) visit within the authorizing provider's specialty    No active pregnancy on record    No positive pregnancy test in past 12 months     Pharmacy   [facility] [address] [place], [place] - [address] [place] [address]     Our records indicate duplicate request. Same requesting pharmacy. Eileen Hart RN

## 2018-10-01 ENCOUNTER — OFFICE VISIT (OUTPATIENT)
Dept: FAMILY MEDICINE | Facility: CLINIC | Age: 23
End: 2018-10-01
Payer: COMMERCIAL

## 2018-10-01 VITALS
TEMPERATURE: 99 F | HEIGHT: 65 IN | SYSTOLIC BLOOD PRESSURE: 146 MMHG | HEART RATE: 106 BPM | BODY MASS INDEX: 44.85 KG/M2 | OXYGEN SATURATION: 97 % | WEIGHT: 269.2 LBS | DIASTOLIC BLOOD PRESSURE: 101 MMHG

## 2018-10-01 DIAGNOSIS — G44.019 EPISODIC CLUSTER HEADACHE, NOT INTRACTABLE: Primary | ICD-10-CM

## 2018-10-01 DIAGNOSIS — R68.83 CHILLS (WITHOUT FEVER): ICD-10-CM

## 2018-10-01 DIAGNOSIS — R03.0 ELEVATED BLOOD PRESSURE READING WITHOUT DIAGNOSIS OF HYPERTENSION: ICD-10-CM

## 2018-10-01 DIAGNOSIS — E66.01 MORBID OBESITY (H): ICD-10-CM

## 2018-10-01 PROCEDURE — 99214 OFFICE O/P EST MOD 30 MIN: CPT | Performed by: FAMILY MEDICINE

## 2018-10-01 RX ORDER — SUMATRIPTAN 25 MG/1
25-50 TABLET, FILM COATED ORAL
Qty: 18 TABLET | Refills: 1 | Status: SHIPPED | OUTPATIENT
Start: 2018-10-01 | End: 2021-05-18 | Stop reason: DRUGHIGH

## 2018-10-01 NOTE — NURSING NOTE
"Chief Complaint   Patient presents with     Dizziness     Chills     BP (!) 146/101  Pulse 106  Temp 99  F (37.2  C) (Oral)  Ht 5' 4.5\" (1.638 m)  Wt 269 lb 3.2 oz (122.1 kg)  LMP 09/26/2018 (Approximate)  SpO2 97%  BMI 45.49 kg/m2 Estimated body mass index is 45.49 kg/(m^2) as calculated from the following:    Height as of this encounter: 5' 4.5\" (1.638 m).    Weight as of this encounter: 269 lb 3.2 oz (122.1 kg).  Medication Reconciliation: complete      Health Maintenance that is potentially due pending provider review:  Chlamydia screening    Possibly completing today per provider review.    INOCENTE Taylor  "

## 2018-10-01 NOTE — PROGRESS NOTES
"  SUBJECTIVE:   Bisi Prasad is a 23 year old female who presents to clinic today for the following health issues:      Body Chills      Duration: started today    Description (location/character/radiation): feels chills throughout whole body      Intensity:  moderate    Accompanying signs and symptoms: lightheadedness, headache     History (similar episodes/previous evaluation): None    Precipitating or alleviating factors: None    Therapies tried and outcome: None   22 yo who presents to the clinic for evaluation of a sudden onset dizziness,  headache, pressure behind her right eye and nausea without vomiting. Also reports sensitivity to light. She reports that the pain is on the scale of 4/10. Denies any fevers but reports intermittent chills. She denies any weakness. Denies any recent travel. Denies any upper respiratory tract symptoms.     Problem list and histories reviewed & adjusted, as indicated.  Additional history: as documented    Reviewed and updated as needed this visit by clinical staff  Tobacco  Allergies  Meds       Reviewed and updated as needed this visit by Provider         ROS:  Constitutional, HEENT, cardiovascular, pulmonary, gi and gu systems are negative, except as otherwise noted.    OBJECTIVE:     BP (!) 146/101  Pulse 106  Temp 99  F (37.2  C) (Oral)  Ht 5' 4.5\" (1.638 m)  Wt 269 lb 3.2 oz (122.1 kg)  LMP 09/26/2018 (Approximate)  SpO2 97%  BMI 45.49 kg/m2  Body mass index is 45.49 kg/(m^2).  GENERAL: healthy, alert and no distress  EYES: Eyes grossly normal to inspection, PERRL and conjunctivae and sclerae normal  HENT: ear canals and TM's normal, nose and mouth without ulcers or lesions  NECK: no adenopathy, no asymmetry, masses, or scars and thyroid normal to palpation  RESP: lungs clear to auscultation - no rales, rhonchi or wheezes  CV: regular rate and rhythm, normal S1 S2, no S3 or S4, no murmur, click or rub, no peripheral edema and peripheral pulses strong  PSYCH: " mentation appears normal, affect normal/bright    Diagnostic Test Results:  none     ASSESSMENT/PLAN:       ICD-10-CM    1. Episodic cluster headache, not intractable G44.019 SUMAtriptan (IMITREX) 25 MG tablet   2. Chills (without fever) R68.83    3. Elevated blood pressure reading without diagnosis of hypertension R03.0    4. Morbid obesity (H) E66.01      The patient presents to clinic with generalized symptoms. Chills, dizziness, Nausea, sensitivity to light and pain behind right eye. Differential diagnoses are broad. It includes headache syndrome including migraine, cluster headache, viral syndrome, or dehydration. Patient was advised to start with OTC medications for headache relief. She was given a trial of sumatriptan to take if OTC fails to improve. .     Also, the patient is morbidly obese, has elevated blood pressure and has a headache. 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 This is the patient's first visit and it would not be appropriate to reach this diagnosis at this time. Will keep it in our list of differntials.       Chills are not very specific.  Advised to monitor symptoms. Return to clinic as needed.     Also, the patient was noted to have an elevated blood pressure. This is the first elevated blood pressure documented in her chart. Advised to monitor and return to clinic if her blood pressure continues to be elevated.       Omkar Davis MD  [facility]

## 2018-10-01 NOTE — PATIENT INSTRUCTIONS
Cluster Headache  A cluster headache is different from a migraine or a tension headache. A cluster headache starts suddenly, without any warning. The pain can become severe within minutes. The headache is often brief. It can last only 15 minutes. But it can also continue for several hours.  The pain is around a single eye. You may have tears coming from that eye. That eyelid may become droopy. The eye may be red and swollen. You may also have a stuffy or runny nose on the affected side.  You may also have several headaches in one 24-hour period. These may return at the same time of day during the cluster period. A cluster headache ends as suddenly as it began. It often leaves you feeling exhausted for some time afterward.  Cluster headaches often occur at night and during certain times of the year that are unique to you. A cluster period ranges from a few weeks up to a few months. Then, the headaches may not come back for months or years.  Possible triggers include alcohol and smoking. Even a single alcoholic drink can trigger a headache during the cluster period. Another potential trigger is interrupted sleep patterns. Medicines like nitroglycerin can also cause a cluster headache.  Treatment for cluster headaches varies. Headaches that last only 15 minutes aren t helped by over-the-counter medicines. That s because these medicines take 20 to 30 minutes to start working. Prescription medicines given by injection or as a nasal spray can stop a headache. This is called abortive treatment.  Preventive medicines include steroids and anti-seizure medicines. These can help cut the number of headaches you have during a cluster period. High dose oxygen therapy or a nerve stimulator may shorten each attack and make it less severe. If you have cluster headaches often or have severe symptoms, your healthcare provider may talk with you about surgery. Surgery may be able to stop the nerve that s sending the pain  signals. These types of treatments are usually given by a specialist (neurologist or neurosurgeon).  Home care  Follow these tips when caring for yourself at home:    Don t drive yourself home if you were given pain medicine for your headache. Instead, have someone else drive you home. Try to sleep when you get home. You should feel much better when you wake up.    If your healthcare provider gave you preventive medicines, take them as directed. If abortive medicines were prescribed, carry these with you to take at first sign of the headache.    Stick to a regular sleep schedule. Avoid afternoon naps during a cluster period. If you have sleep apnea, talk with your provider about getting treatment for this condition. Sleep apnea greatly interferes with sleep patterns.    Stay away from gas fumes and oil-based paint fumes.    Avoid drinking alcohol and smoking during a cluster period.    Be cautious when traveling to high altitudes. Higher altitudes have less oxygen. This may trigger a headache.    Use sunglasses to avoid glare and bright lights.    Keep a headache journal. Record the date and time of each headache. Describe the quality of the pain. Note how severe it is, where it is, and how long it lasts. Write down your response to any medicines to control the pain. Note possible triggers: Was it something you ate? Something you did just before the attack? Share this information with your provider to help him or her figure out the best treatment plan for you.    Talk with a counselor or therapist, or join a support group. This may help you cope with the effects of cluster headache on your life.  Follow-up care  Follow up with your healthcare provider, or as advised If you had a CT scan or an MRI, a specialist will review it. You will be told of any new findings that may affect your care.  When to seek medical advice  Call your healthcare provider right away if any of these occur:    Sudden, severe headache, worse  than any you have had before    Headache with a fainting spell    Unexplained fever greater than 100.0  F (37.8  C), or as advised    Stiff neck or rash    Weakness in an arm or leg, or on one side of your face    Difficulty speaking    Headaches brought on by physical activity    Changes in your vision    You need to use more pain medicine than normal  Date Last Reviewed: 8/1/2016 2000-2017 [employer]. [address] [place] [address], [place] [address]. All rights reserved. This information is not intended as a substitute for professional medical care. Always follow your healthcare professional's instructions.

## 2018-10-01 NOTE — MR AVS SNAPSHOT
After Visit Summary   10/1/2018    Bisi Prasad    MRN: [id number]           Patient Information     Date Of Birth          1995        Visit Information        Provider Department      10/1/2018 1:40 PM Omkar Davis MD [facility]        Today's Diagnoses     Episodic cluster headache, not intractable    -  1    Morbid obesity (H)          Care Instructions      Cluster Headache  A cluster headache is different from a migraine or a tension headache. A cluster headache starts suddenly, without any warning. The pain can become severe within minutes. The headache is often brief. It can last only 15 minutes. But it can also continue for several hours.  The pain is around a single eye. You may have tears coming from that eye. That eyelid may become droopy. The eye may be red and swollen. You may also have a stuffy or runny nose on the affected side.  You may also have several headaches in one 24-hour period. These may return at the same time of day during the cluster period. A cluster headache ends as suddenly as it began. It often leaves you feeling exhausted for some time afterward.  Cluster headaches often occur at night and during certain times of the year that are unique to you. A cluster period ranges from a few weeks up to a few months. Then, the headaches may not come back for months or years.  Possible triggers include alcohol and smoking. Even a single alcoholic drink can trigger a headache during the cluster period. Another potential trigger is interrupted sleep patterns. Medicines like nitroglycerin can also cause a cluster headache.  Treatment for cluster headaches varies. Headaches that last only 15 minutes aren t helped by over-the-counter medicines. That s because these medicines take 20 to 30 minutes to start working. Prescription medicines given by injection or as a nasal spray can stop a headache. This is called abortive treatment.  Preventive medicines  include steroids and anti-seizure medicines. These can help cut the number of headaches you have during a cluster period. High dose oxygen therapy or a nerve stimulator may shorten each attack and make it less severe. If you have cluster headaches often or have severe symptoms, your healthcare provider may talk with you about surgery. Surgery may be able to stop the nerve that s sending the pain signals. These types of treatments are usually given by a specialist (neurologist or neurosurgeon).  Home care  Follow these tips when caring for yourself at home:    Don t drive yourself home if you were given pain medicine for your headache. Instead, have someone else drive you home. Try to sleep when you get home. You should feel much better when you wake up.    If your healthcare provider gave you preventive medicines, take them as directed. If abortive medicines were prescribed, carry these with you to take at first sign of the headache.    Stick to a regular sleep schedule. Avoid afternoon naps during a cluster period. If you have sleep apnea, talk with your provider about getting treatment for this condition. Sleep apnea greatly interferes with sleep patterns.    Stay away from gas fumes and oil-based paint fumes.    Avoid drinking alcohol and smoking during a cluster period.    Be cautious when traveling to high altitudes. Higher altitudes have less oxygen. This may trigger a headache.    Use sunglasses to avoid glare and bright lights.    Keep a headache journal. Record the date and time of each headache. Describe the quality of the pain. Note how severe it is, where it is, and how long it lasts. Write down your response to any medicines to control the pain. Note possible triggers: Was it something you ate? Something you did just before the attack? Share this information with your provider to help him or her figure out the best treatment plan for you.    Talk with a counselor or therapist, or join a support group.  This may help you cope with the effects of cluster headache on your life.  Follow-up care  Follow up with your healthcare provider, or as advised If you had a CT scan or an MRI, a specialist will review it. You will be told of any new findings that may affect your care.  When to seek medical advice  Call your healthcare provider right away if any of these occur:    Sudden, severe headache, worse than any you have had before    Headache with a fainting spell    Unexplained fever greater than 100.0  F (37.8  C), or as advised    Stiff neck or rash    Weakness in an arm or leg, or on one side of your face    Difficulty speaking    Headaches brought on by physical activity    Changes in your vision    You need to use more pain medicine than normal  Date Last Reviewed: 8/1/2016 2000-2017 [employer]. [address] [place] [address]. All rights reserved. This information is not intended as a substitute for professional medical care. Always follow your healthcare professional's instructions.                Follow-ups after your visit        Your next 10 appointments already scheduled     Oct 12, 2018  3:45 PM BRIT   [facility] with Florecita Silva,    [facility] ([facility])    [address] [place] [address] [phone]              Who to contact     If you have questions or need follow up information about today's clinic visit or your schedule please contact [facility] directly at [phone].  Normal or non-critical lab and imaging results will be communicated to you by MyChart, letter or phone within 4 business days after the clinic has received the results. If you do not hear from us within 7 days, please contact the clinic through [employer] or phone. If you have a critical or abnormal lab result, we will notify you by phone as soon as possible.  Submit refill requests through [employer] or call your pharmacy and they will  "forward the refill request to us. Please allow 3 business days for your refill to be completed.          Additional Information About Your Visit        [email or web address] Information     [employer] gives you secure access to your electronic health record. If you see a primary care provider, you can also send messages to your care team and make appointments. If you have questions, please call your primary care clinic.  If you do not have a primary care provider, please call [phone] and they will assist you.        Care EveryWhere ID     This is your Care EveryWhere ID. This could be used by other organizations to access your [facility] medical records  [id number]        Your Vitals Were     Pulse Temperature Height Last Period Pulse Oximetry BMI (Body Mass Index)    106 99  F (37.2  C) (Oral) 5' 4.5\" (1.638 m) 09/26/2018 (Approximate) 97% 45.49 kg/m2       Blood Pressure from Last 3 Encounters:   10/01/18 (!) 146/101   08/01/18 137/90   08/03/17 132/78    Weight from Last 3 Encounters:   10/01/18 269 lb 3.2 oz (122.1 kg)   08/01/18 265 lb 12.8 oz (120.6 kg)   08/03/17 249 lb 4.8 oz (113.1 kg)              Today, you had the following     No orders found for display         Today's Medication Changes          These changes are accurate as of 10/1/18  2:09 PM.  If you have any questions, ask your nurse or doctor.               Start taking these medicines.        Dose/Directions    SUMAtriptan 25 MG tablet   Commonly known as:  IMITREX   Used for:  Episodic cluster headache, not intractable   Started by:  Omkar Davis MD        Dose:  25-50 mg   Take 1-2 tablets (25-50 mg) by mouth at onset of headache for migraine May repeat in 2 hours. Max 8 tablets/24 hours.   Quantity:  18 tablet   Refills:  1            Where to get your medicines      These medications were sent to [facility] [address] [place], [place] - [address] [address]     Phone:  [phone]     SUMAtriptan 25 MG " tablet                Primary Care Provider Office Phone # Fax #    Florecita Silva [phone] [address] [address]        Equal Access to Services     YOKASTA CORRIGAN : Hadii cirilo ku hadlazaroo Sopaulaali, waaxda luqadaha, qaybta kaalmada adetrevinyada, susan rogersalberta kelly. So [facility] [phone].    ATENCIÓN: Si habla español, tiene a phan disposición servicios gratuitos de asistencia lingüística. Llame al [phone].    We comply with applicable federal civil rights laws and [place] laws. We do not discriminate on the basis of race, color, national origin, age, disability, sex, sexual orientation, or gender identity.            Thank you!     Thank you for choosing [facility]  for your care. Our goal is always to provide you with excellent care. Hearing back from our patients is one way we can continue to improve our services. Please take a few minutes to complete the written survey that you may receive in the mail after your visit with us. Thank you!             Your Updated Medication List - Protect others around you: Learn how to safely use, store and throw away your medicines at www.disposemymeds.org.          This list is accurate as of 10/1/18  2:09 PM.  Always use your most recent med list.                   Brand Name Dispense Instructions for use Diagnosis    busPIRone 5 MG tablet    BUSPAR     TAKE 1 TABLET BY MOUTH TWICE A DAY        chlorhexidine 0.12 % solution    PERIDEX     RINSE WITH 1/2OZ TWICE DAY FOR 30 SECONDS & EXPECTORATE, AVOID RINSING OR EATING FOR 30 MIN        DENTA 5000 PLUS 1.1 % Crea   Generic drug:  Sodium Fluoride      See Admin Instructions        escitalopram 20 MG tablet    LEXAPRO     Take 20 mg by mouth daily        GIANVI 3-0.02 MG per tablet   Generic drug:  drospirenone-ethinyl estradiol     84 tablet    TAKE 1 TABLET BY MOUTH EVERY DAY    Irregular menses       ibuprofen 800 MG tablet    ADVIL/MOTRIN      TAKE 1 TABLET BY MOUTH EVERY 6 HOURS AS NEEDED FOR PAIN        phentermine 15 MG capsule     30 capsule    Take 1 capsule (15 mg) by mouth every morning    Morbid obesity (H)       SUMAtriptan 25 MG tablet    IMITREX    18 tablet    Take 1-2 tablets (25-50 mg) by mouth at onset of headache for migraine May repeat in 2 hours. Max 8 tablets/24 hours.    Episodic cluster headache, not intractable       Vitamin D (Cholecalciferol) 1000 units Caps

## 2018-10-12 ENCOUNTER — OFFICE VISIT (OUTPATIENT)
Dept: FAMILY MEDICINE | Facility: CLINIC | Age: 23
End: 2018-10-12
Payer: COMMERCIAL

## 2018-10-12 VITALS
HEIGHT: 65 IN | TEMPERATURE: 97.9 F | OXYGEN SATURATION: 98 % | BODY MASS INDEX: 43.82 KG/M2 | WEIGHT: 263 LBS | DIASTOLIC BLOOD PRESSURE: 84 MMHG | SYSTOLIC BLOOD PRESSURE: 123 MMHG | HEART RATE: 89 BPM

## 2018-10-12 DIAGNOSIS — Z11.3 SCREEN FOR STD (SEXUALLY TRANSMITTED DISEASE): ICD-10-CM

## 2018-10-12 DIAGNOSIS — Z00.00 ENCOUNTER FOR ROUTINE ADULT HEALTH EXAMINATION WITHOUT ABNORMAL FINDINGS: Primary | ICD-10-CM

## 2018-10-12 PROCEDURE — 90686 IIV4 VACC NO PRSV 0.5 ML IM: CPT | Performed by: FAMILY MEDICINE

## 2018-10-12 PROCEDURE — 87591 N.GONORRHOEAE DNA AMP PROB: CPT | Performed by: FAMILY MEDICINE

## 2018-10-12 PROCEDURE — 87491 CHLMYD TRACH DNA AMP PROBE: CPT | Performed by: FAMILY MEDICINE

## 2018-10-12 PROCEDURE — 99395 PREV VISIT EST AGE 18-39: CPT | Mod: 25 | Performed by: FAMILY MEDICINE

## 2018-10-12 PROCEDURE — 90471 IMMUNIZATION ADMIN: CPT | Performed by: FAMILY MEDICINE

## 2018-10-12 ASSESSMENT — ANXIETY QUESTIONNAIRES
2. NOT BEING ABLE TO STOP OR CONTROL WORRYING: MORE THAN HALF THE DAYS
IF YOU CHECKED OFF ANY PROBLEMS ON THIS QUESTIONNAIRE, HOW DIFFICULT HAVE THESE PROBLEMS MADE IT FOR YOU TO DO YOUR WORK, TAKE CARE OF THINGS AT HOME, OR GET ALONG WITH OTHER PEOPLE: SOMEWHAT DIFFICULT
7. FEELING AFRAID AS IF SOMETHING AWFUL MIGHT HAPPEN: SEVERAL DAYS
6. BECOMING EASILY ANNOYED OR IRRITABLE: SEVERAL DAYS
1. FEELING NERVOUS, ANXIOUS, OR ON EDGE: MORE THAN HALF THE DAYS
3. WORRYING TOO MUCH ABOUT DIFFERENT THINGS: MORE THAN HALF THE DAYS
5. BEING SO RESTLESS THAT IT IS HARD TO SIT STILL: MORE THAN HALF THE DAYS
GAD7 TOTAL SCORE: 11

## 2018-10-12 ASSESSMENT — PATIENT HEALTH QUESTIONNAIRE - PHQ9: 5. POOR APPETITE OR OVEREATING: SEVERAL DAYS

## 2018-10-12 NOTE — MR AVS SNAPSHOT
After Visit Summary   10/12/2018    Bisi Prasad    MRN: [id number]           Patient Information     Date Of Birth          1995        Visit Information        Provider Department      10/12/2018 3:45 PM Florecita Silva DO [facility] [place]        Today's Diagnoses     Encounter for routine adult health examination without abnormal findings    -  1    Screen for STD (sexually transmitted disease)          Care Instructions      Preventive Health Recommendations  Female Ages 21 to 25     Yearly exam:     See your health care provider every year in order to  o Review health changes.   o Discuss preventive care.    o Review your medicines if your doctor has prescribed any.      You should be tested each year for STDs (sexually transmitted diseases).       Talk to your provider about how often you should have cholesterol testing.      Get a Pap test every three years. If you have an abnormal result, your doctor may have you test more often.      If you are at risk for diabetes, you should have a diabetes test (fasting glucose).     Shots:     Get a flu shot each year.     Get a tetanus shot every 10 years.     Consider getting the shot (vaccine) that prevents cervical cancer (Gardasil).    Nutrition:     Eat at least 5 servings of fruits and vegetables each day.    Eat whole-grain bread, whole-wheat pasta and brown rice instead of white grains and rice.    Get adequate Calcium and Vitamin D.     Lifestyle    Exercise at least 150 minutes a week each week (30 minutes a day, 5 days a week). This will help you control your weight and prevent disease.    Limit alcohol to one drink per day.    No smoking.     Wear sunscreen to prevent skin cancer.    See your dentist every six months for an exam and cleaning.          Follow-ups after your visit        Who to contact     If you have questions or need follow up information about today's clinic visit or your schedule please contact [place]  [facility] directly at [phone].  Normal or non-critical lab and imaging results will be communicated to you by MyChart, letter or phone within 4 business days after the clinic has received the results. If you do not hear from us within 7 days, please contact the clinic through [employer] or phone. If you have a critical or abnormal lab result, we will notify you by phone as soon as possible.  Submit refill requests through [employer] or call your pharmacy and they will forward the refill request to us. Please allow 3 business days for your refill to be completed.          Additional Information About Your Visit        [employer] Information     [employer] gives you secure access to your electronic health record. If you see a primary care provider, you can also send messages to your care team and make appointments. If you have questions, please call your primary care clinic.  If you do not have a primary care provider, please call [phone] and they will assist you.        Care EveryWhere ID     This is your Care EveryWhere ID. This could be used by other organizations to access your [place] medical records  [id number]        Your Vitals Were     Pulse Temperature Height Last Period Pulse Oximetry BMI (Body Mass Index)    89 97.9  F (36.6  C) (Oral) 5' 4.5\" (1.638 m) 09/26/2018 (Approximate) 98% 44.45 kg/m2       Blood Pressure from Last 3 Encounters:   10/12/18 123/84   10/01/18 (!) 146/101   08/01/18 137/90    Weight from Last 3 Encounters:   10/12/18 263 lb (119.3 kg)   10/01/18 269 lb 3.2 oz (122.1 kg)   08/01/18 265 lb 12.8 oz (120.6 kg)              We Performed the Following     CHLAMYDIA TRACHOMATIS PCR     FLU VAC PRESRV FREE QUAD SPLIT VIR, IM (3+ YRS)     NEISSERIA GONORRHOEA PCR     VACCINE ADMINISTRATION, INITIAL        Primary Care Provider Office Phone # Fax #    Florecita Silva [phone] [address] [address]        Equal Access to Services     [facility] " GAAR : Hadii aad ku obi Toro, waaxda luqadaha, qaybta kahenryda marissa, susan abdelrahman kandyedward fuller analior fink . So [facility] [phone].    ATENCIÓN: Si habla español, tiene a phan disposición servicios gratuitos de asistencia lingüística. Llame al [phone].    We comply with applicable federal civil rights laws and [place] laws. We do not discriminate on the basis of race, color, national origin, age, disability, sex, sexual orientation, or gender identity.            Thank you!     Thank you for choosing [facility]  for your care. Our goal is always to provide you with excellent care. Hearing back from our patients is one way we can continue to improve our services. Please take a few minutes to complete the written survey that you may receive in the mail after your visit with us. Thank you!             Your Updated Medication List - Protect others around you: Learn how to safely use, store and throw away your medicines at www.disposemymeds.org.          This list is accurate as of 10/12/18  4:38 PM.  Always use your most recent med list.                   Brand Name Dispense Instructions for use Diagnosis    busPIRone 5 MG tablet    BUSPAR     TAKE 1 TABLET BY MOUTH TWICE A DAY        DENTA 5000 PLUS 1.1 % Crea   Generic drug:  Sodium Fluoride      See Admin Instructions        escitalopram 20 MG tablet    LEXAPRO     Take 20 mg by mouth daily        GIANVI 3-0.02 MG per tablet   Generic drug:  drospirenone-ethinyl estradiol     84 tablet    TAKE 1 TABLET BY MOUTH EVERY DAY    Irregular menses       SUMAtriptan 25 MG tablet    IMITREX    18 tablet    Take 1-2 tablets (25-50 mg) by mouth at onset of headache for migraine May repeat in 2 hours. Max 8 tablets/24 hours.    Episodic cluster headache, not intractable       Vitamin D (Cholecalciferol) 1000 units Caps

## 2018-10-12 NOTE — PROGRESS NOTES
SUBJECTIVE:   CC: Bisi Prasad is an 23 year old woman who presents for preventive health visit.     Physical   Annual:     Getting at least 3 servings of Calcium per day:  Yes    Bi-annual eye exam:  Yes    Dental care twice a year:  Yes    Sleep apnea or symptoms of sleep apnea:  None    Diet:  Regular (no restrictions)    Frequency of exercise:  2-3 days/week    Duration of exercise:  15-30 minutes    Taking medications regularly:  No    Barriers to taking medications:  Problems remembering to take them    Additional concerns today:  YES        -------------------------------------    Today's PHQ-2 Score:   PHQ-2 ( 1999 [employer]) 10/12/2018   Q1: Little interest or pleasure in doing things 1   Q2: Feeling down, depressed or hopeless 1   PHQ-2 Score 2   Q1: Little interest or pleasure in doing things Several days   Q2: Feeling down, depressed or hopeless Several days   PHQ-2 Score 2       Abuse: Current or Past(Physical, Sexual or Emotional)- NO  Do you feel safe in your environment - YES    Social History   Substance Use Topics     Smoking status: Never Smoker     Smokeless tobacco: Never Used     Alcohol use Yes      Comment: less than once a week      Alcohol Use 10/12/2018   If you drink alcohol do you typically have greater than 3 drinks per day OR greater than 7 drinks per week? No   No flowsheet data found.    Reviewed orders with patient.  Reviewed health maintenance and updated orders accordingly - Yes  Patient Active Problem List   Diagnosis     Rosacea     KP (keratosis pilaris)     CARDIOVASCULAR SCREENING; LDL GOAL LESS THAN 130     Obesity, unspecified obesity severity, unspecified obesity type     BERTHA (generalized anxiety disorder)     Recurrent major depressive disorder, in remission (H)     PTSD (post-traumatic stress disorder)     Morbid obesity (H)     Past Surgical History:   Procedure Laterality Date     NO HISTORY OF SURGERY         Social History   Substance Use Topics     Smoking  status: Never Smoker     Smokeless tobacco: Never Used     Alcohol use Yes      Comment: less than once a week      Family History   Problem Relation Age of Onset     Hypertension Mother      Obesity Mother      Cardiovascular Father      Depression Father      Obesity Father      Depression Brother      Depression Sister      Breast Cancer No family hx of      Cancer - colorectal No family hx of      Prostate Cancer No family hx of      Diabetes No family hx of      Coronary Artery Disease No family hx of      Hyperlipidemia No family hx of      Cerebrovascular Disease No family hx of      Colon Cancer No family hx of      Thyroid Disease No family hx of      Genetic Disorder No family hx of            Mammogram not appropriate for this patient based on age.    Pertinent mammograms are reviewed under the imaging tab.  History of abnormal Pap smear: NO - age 21-29 PAP every 3 years recommended  PAP / HPV 8/3/2017   PAP NIL     Reviewed and updated as needed this visit by clinical staff  Allergies  Meds         Reviewed and updated as needed this visit by Provider            Review of Systems  CONSTITUTIONAL:NEGATIVE for fever, chills, change in weight  INTEGUMENTARU/SKIN: NEGATIVE for worrisome rashes, moles or lesions  EYES: NEGATIVE for vision changes or irritation  ENT: NEGATIVE for ear, mouth and throat problems  RESP: NEGATIVE for significant cough or SOB  BREAST: NEGATIVE for masses, tenderness or discharge  CV: NEGATIVE for chest pain, palpitations or peripheral edema  GI: NEGATIVE for nausea, abdominal pain, heartburn, or change in bowel habits  : NEGATIVE for unusual urinary or vaginal symptoms. Periods are regular.  MUSCULOSKELETAL: NEGATIVE for significant arthralgias or myalgia  NEURO: NEGATIVE for weakness, dizziness or paresthesias  PSYCHIATRIC: NEGATIVE for changes in mood or affect     OBJECTIVE:   LMP 09/26/2018 (Approximate)  Physical Exam  GENERAL: alert, no distress and obese  EYES: Eyes  "grossly normal to inspection, PERRL and conjunctivae and sclerae normal  HENT: ear canals and TM's normal, nose and mouth without ulcers or lesions  NECK: no adenopathy, no asymmetry, masses, or scars and thyroid normal to palpation  RESP: lungs clear to auscultation - no rales, rhonchi or wheezes  BREAST: no palpable axillary masses or adenopathy and fibrocystic changes left  CV: regular rate and rhythm, normal S1 S2, no S3 or S4, no murmur, click or rub, no peripheral edema and peripheral pulses strong  ABDOMEN: soft, nontender, no hepatosplenomegaly, no masses and bowel sounds normal  MS: no gross musculoskeletal defects noted, no edema  SKIN: no suspicious lesions or rashes  NEURO: Normal strength and tone, mentation intact and speech normal  PSYCH: mentation appears normal, affect normal/bright    Diagnostic Test Results:  pending    ASSESSMENT/PLAN:   1. Encounter for routine adult health examination without abnormal findings  Routine screening  Pap is UTD  - FLU VAC PRESRV FREE QUAD SPLIT VIR, IM (3+ YRS)  - VACCINE ADMINISTRATION, INITIAL    2. Screen for STD (sexually transmitted disease)  Checking STI  - NEISSERIA GONORRHOEA PCR  - CHLAMYDIA TRACHOMATIS PCR    Reviewed the recommendations for IUD    Reviewed the risk/benefits of IUD-including but not limiting to bleeding, infection and uterine perforation.  Discussed the differences between the two types of IUDs - Mirena vs gregor vs Kyleena or paraguard.  Advised to take 3-4 ibuprofen one hour prior to appointment.      COUNSELING:  Reviewed preventive health counseling, as reflected in patient instructions    BP Readings from Last 1 Encounters:   10/01/18 (!) 146/101     Estimated body mass index is 45.49 kg/(m^2) as calculated from the following:    Height as of 10/1/18: 5' 4.5\" (1.638 m).    Weight as of 10/1/18: 269 lb 3.2 oz (122.1 kg).    BP Screening:   Last 3 BP Readings:    BP Readings from Last 3 Encounters:   10/12/18 123/84   10/01/18 (!) " 146/101   08/01/18 137/90       The following was recommended to the patient:  Re-screen BP within a year and recommended lifestyle modifications  Weight management plan: Discussed healthy diet and exercise guidelines and patient will follow up in 12 months in clinic to re-evaluate.     reports that she has never smoked. She has never used smokeless tobacco.      Counseling Resources:  ATP IV Guidelines  Pooled Cohorts Equation Calculator  Breast Cancer Risk Calculator  FRAX Risk Assessment  ICSI Preventive Guidelines  Dietary Guidelines for Americans, 2010  USDA's MyPlate  ASA Prophylaxis  Lung CA Screening    Florecita Silva,   [facility]

## 2018-10-12 NOTE — NURSING NOTE
"Chief Complaint   Patient presents with     Physical     discuss alternate to OCP     /84  Pulse 89  Temp 97.9  F (36.6  C) (Oral)  Ht 5' 4.5\" (1.638 m)  Wt 263 lb (119.3 kg)  LMP 09/26/2018 (Approximate)  SpO2 98%  BMI 44.45 kg/m2 Estimated body mass index is 44.45 kg/(m^2) as calculated from the following:    Height as of this encounter: 5' 4.5\" (1.638 m).    Weight as of this encounter: 263 lb (119.3 kg).        Health Maintenance due pending provider review:  NONE    n/a    Monique Walden CMA  "

## 2018-10-13 ASSESSMENT — ANXIETY QUESTIONNAIRES: GAD7 TOTAL SCORE: 11

## 2018-10-13 ASSESSMENT — PATIENT HEALTH QUESTIONNAIRE - PHQ9: SUM OF ALL RESPONSES TO PHQ QUESTIONS 1-9: 6

## 2018-10-19 LAB
C TRACH DNA SPEC QL NAA+PROBE: NEGATIVE
N GONORRHOEA DNA SPEC QL NAA+PROBE: NEGATIVE
SPECIMEN SOURCE: NORMAL
SPECIMEN SOURCE: NORMAL

## 2018-10-31 ENCOUNTER — OFFICE VISIT (OUTPATIENT)
Dept: FAMILY MEDICINE | Facility: CLINIC | Age: 23
End: 2018-10-31
Payer: COMMERCIAL

## 2018-10-31 VITALS
SYSTOLIC BLOOD PRESSURE: 139 MMHG | OXYGEN SATURATION: 97 % | HEART RATE: 125 BPM | HEIGHT: 65 IN | RESPIRATION RATE: 14 BRPM | BODY MASS INDEX: 43.82 KG/M2 | DIASTOLIC BLOOD PRESSURE: 95 MMHG | TEMPERATURE: 98 F | WEIGHT: 263 LBS

## 2018-10-31 DIAGNOSIS — Z97.5 IUD (INTRAUTERINE DEVICE) IN PLACE: ICD-10-CM

## 2018-10-31 DIAGNOSIS — Z01.812 PRE-PROCEDURE LAB EXAM: ICD-10-CM

## 2018-10-31 DIAGNOSIS — Z30.430 ENCOUNTER FOR IUD INSERTION: Primary | ICD-10-CM

## 2018-10-31 LAB — BETA HCG QUAL IFA URINE: NEGATIVE

## 2018-10-31 PROCEDURE — 58300 INSERT INTRAUTERINE DEVICE: CPT | Performed by: FAMILY MEDICINE

## 2018-10-31 PROCEDURE — 84703 CHORIONIC GONADOTROPIN ASSAY: CPT | Performed by: FAMILY MEDICINE

## 2018-10-31 NOTE — PROGRESS NOTES
Bisi Prasad is a 23 year old female who presents today requesting placement of an Mirena iud.    The patient meets and is agreeable to the following conditions:    She is not interested in conception in the near future.   She currently is in a stable, monogamous relationship.   There is no previous history of pelvic inflammatory disease.   There is no previous history of ectopic pregnancy.   She is willing to check monthly for the IUD string.   She is at least 8 weeks post-partum.   There is no history of unresolved abnormal uterine bleeding.   There is no history of an unresolved abnormal PAP smear.   She has no history of Michael's disease or an allergy to copper (for ParaGard).   She has no history of diabetes, AIDS, leukemia, IV drug use or chronic steroid use.   She is willing to return annually for PAP smears.   She has had a PAP smear within the past 6 months.   She denies the possibility of pregnancy.   Pregnancy test today is negative.     The following risks were discussed with the patient:  Possibility of pregnancy and ectopic pregnancy.   Possibility of pelvic inflammatory disease, particularly with new partners.   Risk of uterine perforation or IUD expulsion.   Possibility of difficult removal.   Spotting or heavy bleeding.   Cramping, pain or infection during or after insertion.     The patient was given patient information on the IUD and the patient education brochure from the .   The patient has given consent to proceed with placement of the IUD.  A written consent form is present in the chart.   She wishes to proceed.    PROCEDURE:    Type of IUD: Mirena    The patient has taken 600-800mg of tylenol prior to the procedure. She is placed in a dorsal lithotomy potion and a pelvic exam is performed to determine the position of the uterus.  The cervix is identified and cleaned with betadine three times.  A single tooth tenaculum is applied to the anterior lip of the cervix for  stabilization.  The uterus is sounded to 7.5 cm and removed. (Target sound depth is 6.5 cm to 8.5 cm.)  The IUD insertion tube is prepared to manufacturers recommendations and inserted into the uterus under sterile conditions to the sounding depth.   The arms of the IUD are then opened by withdrawing the insertion tube 2.0 cm while stabilizing the solid insertion elisa without difficulty.  The IUD string is then cut to 2.5 cm.    The patient tolerated this procedure without immediate complication.  The patient is to return or call immediately for any unexplained fever, abdominal or pelvic pain, excessive bleeding, possibility of pregnancy, foul-smelling discharge, sense that the IUD has been expelled.    Florecita Silva

## 2018-10-31 NOTE — NURSING NOTE
"Chief Complaint   Patient presents with     IUD     placement       Initial BP (!) 139/95  Pulse 125  Temp 98  F (36.7  C) (Oral)  Resp 14  Ht 5' 4.5\" (1.638 m)  Wt 263 lb (119.3 kg)  LMP 10/22/2018 (Exact Date)  SpO2 97%  Breastfeeding? No  BMI 44.45 kg/m2 Estimated body mass index is 44.45 kg/(m^2) as calculated from the following:    Height as of this encounter: 5' 4.5\" (1.638 m).    Weight as of this encounter: 263 lb (119.3 kg).  BP completed using cuff size: large    Health Maintenance that is potentially due pending provider review:  Health Maintenance Due   Topic Date Due     HIV SCREEN (SYSTEM ASSIGNED)  08/14/2013         Per provider  "

## 2018-10-31 NOTE — MR AVS SNAPSHOT
"              After Visit Summary   10/31/2018    Bisi Prasad    MRN: [id number]           Patient Information     Date Of Birth          1995        Visit Information        Provider Department      10/31/2018 10:30 AM Florecita Silva DO; UP PROC RM 1 [facility]        Today's Diagnoses     Encounter for IUD insertion    -  1    Pre-procedure lab exam        IUD (intrauterine device) in place           Follow-ups after your visit        Who to contact     If you have questions or need follow up information about today's clinic visit or your schedule please contact [facility] directly at [phone].  Normal or non-critical lab and imaging results will be communicated to you by MyChart, letter or phone within 4 business days after the clinic has received the results. If you do not hear from us within 7 days, please contact the clinic through [employer] or phone. If you have a critical or abnormal lab result, we will notify you by phone as soon as possible.  Submit refill requests through [employer] or call your pharmacy and they will forward the refill request to us. Please allow 3 business days for your refill to be completed.          Additional Information About Your Visit        MyChart Information     [employer] gives you secure access to your electronic health record. If you see a primary care provider, you can also send messages to your care team and make appointments. If you have questions, please call your primary care clinic.  If you do not have a primary care provider, please call [phone] and they will assist you.        Care EveryWhere ID     This is your Care EveryWhere ID. This could be used by other organizations to access your [facility] medical records  [id number]        Your Vitals Were     Pulse Temperature Respirations Height Last Period Pulse Oximetry    125 98  F (36.7  C) (Oral) 14 5' 4.5\" (1.638 m) 10/22/2018 (Exact Date) 97%    Breastfeeding? BMI (Body Mass " Index)                No 44.45 kg/m2           Blood Pressure from Last 3 Encounters:   10/31/18 (!) 139/95   10/12/18 123/84   10/01/18 (!) 146/101    Weight from Last 3 Encounters:   10/31/18 263 lb (119.3 kg)   10/12/18 263 lb (119.3 kg)   10/01/18 269 lb 3.2 oz (122.1 kg)              We Performed the Following     Beta HCG Qual, Urine - [facility] and [place] ([id number])     HC LEVONORGESTREL IU 52MG 5 YR     INSERTION INTRAUTERINE DEVICE          Today's Medication Changes          These changes are accurate as of 10/31/18  1:21 PM.  If you have any questions, ask your nurse or doctor.               Start taking these medicines.        Dose/Directions    levonorgestrel 20 MCG/24HR IUD   Commonly known as:  MIRENA   Used for:  Encounter for IUD insertion   Started by:  Florecita Silva DO        Dose:  1 each   1 each (20 mcg) by Intrauterine route once for 1 dose Lot # [id number]   Quantity:  1 each   Refills:  0            Where to get your medicines      Some of these will need a paper prescription and others can be bought over the counter.  Ask your nurse if you have questions.     You don't need a prescription for these medications     levonorgestrel 20 MCG/24HR IUD                Primary Care Provider Office Phone # Fax #    Florecita Silva [phone] [address] [address]        Equal Access to Services     [facility]: Hadii aad ku hadasho Soomaali, waaxda luqadaha, qaybta kaalmada adeegyada, susan kelly. So [facility] [phone].    ATENCIÓN: Si habla español, tiene a phan disposición servicios gratuitos de asistencia lingüística. Llame al [phone].    We comply with applicable federal civil rights laws and [place] laws. We do not discriminate on the basis of race, color, national origin, age, disability, sex, sexual orientation, or gender identity.            Thank you!     Thank you for choosing [facility]  for your  care. Our goal is always to provide you with excellent care. Hearing back from our patients is one way we can continue to improve our services. Please take a few minutes to complete the written survey that you may receive in the mail after your visit with us. Thank you!             Your Updated Medication List - Protect others around you: Learn how to safely use, store and throw away your medicines at www.disposemymeds.org.          This list is accurate as of 10/31/18  1:21 PM.  Always use your most recent med list.                   Brand Name Dispense Instructions for use Diagnosis    busPIRone 5 MG tablet    BUSPAR     TAKE 1 TABLET BY MOUTH TWICE A DAY        DENTA 5000 PLUS 1.1 % Crea   Generic drug:  Sodium Fluoride      See Admin Instructions        escitalopram 20 MG tablet    LEXAPRO     Take 20 mg by mouth daily        levonorgestrel 20 MCG/24HR IUD    MIRENA    1 each    1 each (20 mcg) by Intrauterine route once for 1 dose Lot # [id number]    Encounter for IUD insertion       SUMAtriptan 25 MG tablet    IMITREX    18 tablet    Take 1-2 tablets (25-50 mg) by mouth at onset of headache for migraine May repeat in 2 hours. Max 8 tablets/24 hours.    Episodic cluster headache, not intractable       Vitamin D (Cholecalciferol) 1000 units Caps

## 2018-11-30 ENCOUNTER — OFFICE VISIT (OUTPATIENT)
Dept: FAMILY MEDICINE | Facility: CLINIC | Age: 23
End: 2018-11-30
Payer: COMMERCIAL

## 2018-11-30 VITALS
HEIGHT: 65 IN | SYSTOLIC BLOOD PRESSURE: 136 MMHG | BODY MASS INDEX: 46.22 KG/M2 | WEIGHT: 277.4 LBS | DIASTOLIC BLOOD PRESSURE: 91 MMHG | OXYGEN SATURATION: 100 % | TEMPERATURE: 97.9 F | HEART RATE: 79 BPM

## 2018-11-30 DIAGNOSIS — Z97.5 IUD (INTRAUTERINE DEVICE) IN PLACE: Primary | ICD-10-CM

## 2018-11-30 DIAGNOSIS — I10 BENIGN ESSENTIAL HYPERTENSION: ICD-10-CM

## 2018-11-30 PROCEDURE — 99213 OFFICE O/P EST LOW 20 MIN: CPT | Performed by: FAMILY MEDICINE

## 2018-11-30 NOTE — PROGRESS NOTES
SUBJECTIVE:   Bisi Prasad is a 23 year old female who presents to clinic today for the following health issues:      Chief Complaint   Patient presents with     RECHECK     f/u after IUD insertion, no issues     No concerns -   Mild spotting after placed  None since that time  No discharge  No pelvic pain    -------------------------------------    Problem list and histories reviewed & adjusted, as indicated.  Additional history: as documented    Patient Active Problem List   Diagnosis     Rosacea     KP (keratosis pilaris)     CARDIOVASCULAR SCREENING; LDL GOAL LESS THAN 130     Obesity, unspecified obesity severity, unspecified obesity type     BERTHA (generalized anxiety disorder)     Recurrent major depressive disorder, in remission (H)     PTSD (post-traumatic stress disorder)     Morbid obesity (H)     IUD (intrauterine device) in place     Past Surgical History:   Procedure Laterality Date     NO HISTORY OF SURGERY         Social History   Substance Use Topics     Smoking status: Never Smoker     Smokeless tobacco: Never Used     Alcohol use Yes      Comment: less than once a week      Family History   Problem Relation Age of Onset     Hypertension Mother      Obesity Mother      Cardiovascular Father      Depression Father      Obesity Father      Depression Brother      Depression Sister      Breast Cancer No family hx of      Cancer - colorectal No family hx of      Prostate Cancer No family hx of      Diabetes No family hx of      Coronary Artery Disease No family hx of      Hyperlipidemia No family hx of      Cerebrovascular Disease No family hx of      Colon Cancer No family hx of      Thyroid Disease No family hx of      Genetic Disorder No family hx of            Reviewed and updated as needed this visit by clinical staff  Tobacco  Allergies  Meds  Problems  Med Hx  Surg Hx  Fam Hx  Soc Hx        Reviewed and updated as needed this visit by Provider  Allergies  Meds  Problems      "    ROS:  Constitutional, HEENT, cardiovascular, pulmonary, gi and gu systems are negative, except as otherwise noted.    OBJECTIVE:     BP (!) 136/91  Pulse 79  Temp 97.9  F (36.6  C) (Oral)  Ht 5' 4.5\" (1.638 m)  Wt 277 lb 6.4 oz (125.8 kg)  SpO2 100%  BMI 46.88 kg/m2  Body mass index is 46.88 kg/(m^2).  GENERAL: alert and no distress  Gyn - normal exam, normal cervix with IUD in place -       Diagnostic Test Results:  none     ASSESSMENT/PLAN:     1. IUD (intrauterine device) in place  IUD in place - mirena   Advised any future warning signs and symptoms    2.  HTN -   Discussed BP reading today is just borderline high   She does have home cuff and will continue to check at home  Discussed if readings continue to stay high would consider starting BP med  Gave BP card to keep track    Pt will call or RTC if symptoms worsen or do not improve.      Florecita Silva, DO  [facility]    "

## 2018-11-30 NOTE — NURSING NOTE
"Chief Complaint   Patient presents with     RECHECK     f/u after IUD insertion, no issues     BP (!) 136/91  Pulse 79  Temp 97.9  F (36.6  C) (Oral)  Ht 5' 4.5\" (1.638 m)  Wt 277 lb 6.4 oz (125.8 kg)  SpO2 100%  BMI 46.88 kg/m2 Estimated body mass index is 46.88 kg/(m^2) as calculated from the following:    Height as of this encounter: 5' 4.5\" (1.638 m).    Weight as of this encounter: 277 lb 6.4 oz (125.8 kg).            Monique Walden CMA  "

## 2018-11-30 NOTE — MR AVS SNAPSHOT
"              After Visit Summary   11/30/2018    Bisi Prasad    MRN: [id number]           Patient Information     Date Of Birth          1995        Visit Information        Provider Department      11/30/2018 2:15 PM Florecita Silva, DO [facility]        Today's Diagnoses     IUD (intrauterine device) in place    -  1       Follow-ups after your visit        Follow-up notes from your care team     Return in about 10 months (around 9/30/2019) for Physical Exam - Due October.      Who to contact     If you have questions or need follow up information about today's clinic visit or your schedule please contact [facility] directly at [phone].  Normal or non-critical lab and imaging results will be communicated to you by [employer], letter or phone within 4 business days after the clinic has received the results. If you do not hear from us within 7 days, please contact the clinic through [employer] or phone. If you have a critical or abnormal lab result, we will notify you by phone as soon as possible.  Submit refill requests through [employer] or call your pharmacy and they will forward the refill request to us. Please allow 3 business days for your refill to be completed.          Additional Information About Your Visit        MyChart Information     [employer] gives you secure access to your electronic health record. If you see a primary care provider, you can also send messages to your care team and make appointments. If you have questions, please call your primary care clinic.  If you do not have a primary care provider, please call [phone] and they will assist you.        Care EveryWhere ID     This is your Care EveryWhere ID. This could be used by other organizations to access your [place] medical records  [id number]        Your Vitals Were     Pulse Temperature Height Pulse Oximetry BMI (Body Mass Index)       79 97.9  F (36.6  C) (Oral) 5' 4.5\" (1.638 m) 100% 46.88 kg/m2     "    Blood Pressure from Last 3 Encounters:   11/30/18 (!) 136/91   10/31/18 (!) 139/95   10/12/18 123/84    Weight from Last 3 Encounters:   11/30/18 277 lb 6.4 oz (125.8 kg)   10/31/18 263 lb (119.3 kg)   10/12/18 263 lb (119.3 kg)              Today, you had the following     No orders found for display       Primary Care Provider Office Phone # Fax #    Florecita Silva  [phone]       [address] [address]        Equal Access to Services     [facility]: Hadii aad ku hadasho Soomaali, waaxda luqadaha, qaybta kaalmada adeegyada, susan rodriguez adetrevin fink . So [facility] [phone].    ATENCIÓN: Si habla español, tiene a phan disposición servicios gratuitos de asistencia lingüística. Llame al [phone].    We comply with applicable federal civil rights laws and [place] laws. We do not discriminate on the basis of race, color, national origin, age, disability, sex, sexual orientation, or gender identity.            Thank you!     Thank you for choosing [facility]  for your care. Our goal is always to provide you with excellent care. Hearing back from our patients is one way we can continue to improve our services. Please take a few minutes to complete the written survey that you may receive in the mail after your visit with us. Thank you!             Your Updated Medication List - Protect others around you: Learn how to safely use, store and throw away your medicines at www.disposemymeds.org.          This list is accurate as of 11/30/18  2:35 PM.  Always use your most recent med list.                   Brand Name Dispense Instructions for use Diagnosis    busPIRone 5 MG tablet    BUSPAR     TAKE 1 TABLET BY MOUTH TWICE A DAY        DENTA 5000 PLUS 1.1 % Crea   Generic drug:  Sodium Fluoride      See Admin Instructions        escitalopram 20 MG tablet    LEXAPRO     Take 20 mg by mouth daily        levonorgestrel 20 MCG/24HR IUD    MIRENA    1  each    1 each (20 mcg) by Intrauterine route once for 1 dose Lot # [id number]    Encounter for IUD insertion       SUMAtriptan 25 MG tablet    IMITREX    18 tablet    Take 1-2 tablets (25-50 mg) by mouth at onset of headache for migraine May repeat in 2 hours. Max 8 tablets/24 hours.    Episodic cluster headache, not intractable       Vitamin D (Cholecalciferol) 1000 units Caps

## 2019-03-13 ENCOUNTER — OFFICE VISIT (OUTPATIENT)
Dept: FAMILY MEDICINE | Facility: CLINIC | Age: 24
End: 2019-03-13
Payer: COMMERCIAL

## 2019-03-13 VITALS
DIASTOLIC BLOOD PRESSURE: 90 MMHG | OXYGEN SATURATION: 98 % | HEART RATE: 90 BPM | TEMPERATURE: 98.3 F | SYSTOLIC BLOOD PRESSURE: 137 MMHG | HEIGHT: 65 IN | WEIGHT: 284.1 LBS | BODY MASS INDEX: 47.34 KG/M2

## 2019-03-13 DIAGNOSIS — F41.1 GAD (GENERALIZED ANXIETY DISORDER): ICD-10-CM

## 2019-03-13 DIAGNOSIS — E66.01 MORBID OBESITY (H): ICD-10-CM

## 2019-03-13 DIAGNOSIS — I10 ESSENTIAL HYPERTENSION: Primary | ICD-10-CM

## 2019-03-13 PROCEDURE — 99214 OFFICE O/P EST MOD 30 MIN: CPT | Performed by: FAMILY MEDICINE

## 2019-03-13 RX ORDER — BUPROPION HYDROCHLORIDE 150 MG/1
TABLET ORAL
Refills: 2 | COMMUNITY
Start: 2019-02-27

## 2019-03-13 RX ORDER — METOPROLOL SUCCINATE 50 MG/1
50 TABLET, EXTENDED RELEASE ORAL DAILY
Qty: 30 TABLET | Refills: 1 | Status: SHIPPED | OUTPATIENT
Start: 2019-03-13 | End: 2019-04-16

## 2019-03-13 ASSESSMENT — MIFFLIN-ST. JEOR: SCORE: 2036.61

## 2019-03-13 NOTE — PROGRESS NOTES
"  SUBJECTIVE:   Bisi Prasad is a 23 year old female who presents to clinic today for the following health issues:      Hypertension Follow-up      Outpatient blood pressures are being checked at home.  Results are averaging 140s/90s.    Low Salt Diet: no added salt      Amount of exercise or physical activity: 2-3 days/week for an average of 30-60 minutes    Problems taking medications regularly: No    Medication side effects: started wellbutrin which caused high anxiety at first and could be affecting BP    Diet: regular - less sugar intake and calorie counting     Family hx of HTN   Biological father passed away from CAD late 50's to early 60's    ?dizzienss - unclear if related or if side effect of medications  Some anxious     Obesity -   Had been working on diet and exercise but slipped and gained some weight   Just in the past two weeks started new program and lost 6 pounds   NO[facility] is the program and the work on  psychology of eating -   Has \"health \"      Anxiety -  Has been worse and has been seeing her psychiatrist -   She wondered if med could help both anxiety and her HTN  Readings high at psychiatrist office    -------------------------------------    Problem list and histories reviewed & adjusted, as indicated.  Additional history: as documented    Patient Active Problem List   Diagnosis     Rosacea     KP (keratosis pilaris)     CARDIOVASCULAR SCREENING; LDL GOAL LESS THAN 130     Obesity, unspecified obesity severity, unspecified obesity type     BERTHA (generalized anxiety disorder)     Recurrent major depressive disorder, in remission (H)     PTSD (post-traumatic stress disorder)     Morbid obesity (H)     IUD (intrauterine device) in place     Benign essential hypertension     Past Surgical History:   Procedure Laterality Date     NO HISTORY OF SURGERY         Social History     Tobacco Use     Smoking status: Never Smoker     Smokeless tobacco: Never Used   Substance Use Topics     Alcohol " "use: Yes     Comment: less than once a week      Family History   Problem Relation Age of Onset     Hypertension Mother      Obesity Mother      Cardiovascular Father      Depression Father      Obesity Father      Depression Brother      Depression Sister      Breast Cancer No family hx of      Cancer - colorectal No family hx of      Prostate Cancer No family hx of      Diabetes No family hx of      Coronary Artery Disease No family hx of      Hyperlipidemia No family hx of      Cerebrovascular Disease No family hx of      Colon Cancer No family hx of      Thyroid Disease No family hx of      Genetic Disorder No family hx of            Reviewed and updated as needed this visit by clinical staff  Tobacco  Allergies  Meds  Problems  Med Hx  Surg Hx  Fam Hx       Reviewed and updated as needed this visit by Provider  Tobacco  Allergies  Meds  Problems  Med Hx  Surg Hx  Fam Hx         ROS:  Constitutional, HEENT, cardiovascular, pulmonary, GI, , musculoskeletal, neuro, skin, endocrine and psych systems are negative, except as otherwise noted.    OBJECTIVE:     /90   Pulse 90   Temp 98.3  F (36.8  C) (Oral)   Ht 1.638 m (5' 4.5\")   Wt 128.9 kg (284 lb 1.6 oz)   SpO2 98%   BMI 48.01 kg/m    Body mass index is 48.01 kg/m .  GENERAL: alert and no distress  CV: regular rate and rhythm, normal S1 S2, no S3 or S4, no murmur, click or rub, no peripheral edema and peripheral pulses strong    Diagnostic Test Results:  none     ASSESSMENT/PLAN:     1. Essential hypertension  BP - new diagnosis  Discussed options of hydrochlorothiazide vs beta blocker  Due to her hx of anxiety will start with the toprol   F/u one month  Monitor BP  - metoprolol succinate ER (TOPROL-XL) 50 MG 24 hr tablet; Take 1 tablet (50 mg) by mouth daily  Dispense: 30 tablet; Refill: 1    2. BERTHA (generalized anxiety disorder)  Follows with psychiatrist but will check labs at next visit including CMP, ferritin and B12    3. Morbid " obesity (H)  Working on diet and exercise  Started new program [facility]      F/u one month    Pt will call or RTC if symptoms worsen or do not improve.      Florecita Silva,   [facility]

## 2019-03-13 NOTE — NURSING NOTE
"Chief Complaint   Patient presents with     Hypertension     /90   Pulse 90   Temp 98.3  F (36.8  C) (Oral)   Ht 1.638 m (5' 4.5\")   Wt 128.9 kg (284 lb 1.6 oz)   SpO2 98%   BMI 48.01 kg/m   Estimated body mass index is 48.01 kg/m  as calculated from the following:    Height as of this encounter: 1.638 m (5' 4.5\").    Weight as of this encounter: 128.9 kg (284 lb 1.6 oz).  Medication Reconciliation: complete      Health Maintenance that is potentially due pending provider review:  NONE    n/a    INOCENTE Taylor  "

## 2019-03-18 ENCOUNTER — TELEPHONE (OUTPATIENT)
Dept: FAMILY MEDICINE | Facility: CLINIC | Age: 24
End: 2019-03-18

## 2019-03-18 DIAGNOSIS — I10 BENIGN ESSENTIAL HYPERTENSION: ICD-10-CM

## 2019-03-18 DIAGNOSIS — F33.40 RECURRENT MAJOR DEPRESSIVE DISORDER, IN REMISSION (H): ICD-10-CM

## 2019-03-18 DIAGNOSIS — F41.1 GAD (GENERALIZED ANXIETY DISORDER): Primary | ICD-10-CM

## 2019-03-18 DIAGNOSIS — E66.9 OBESITY, UNSPECIFIED OBESITY SEVERITY, UNSPECIFIED OBESITY TYPE: ICD-10-CM

## 2019-03-18 NOTE — TELEPHONE ENCOUNTER
PN,   See below lab order request from pt  Recently saw you 3/13/2019  Pended all labs pt requesting   Please advise  Thanks,  Tavia STEVENS RN

## 2019-03-18 NOTE — TELEPHONE ENCOUNTER
----- Message -----   From: Bisi Prasad   Sent: 3/18/2019  11:12 AM   To: Up Reception   Subject: Appointment scheduled from [facility]                 Appointment For: Bisi Prasad ([id number])   Visit Type: [facility] LAB VISIT ([id number])      4/10/2019    7:30 AM  15 mins.  UP LAB                    UP LAB      Patient Comments:   Blood panel for iron, vitamin D levels, as well as cholesterol, check thyroid, full panel

## 2019-04-10 DIAGNOSIS — F33.40 RECURRENT MAJOR DEPRESSIVE DISORDER, IN REMISSION (H): ICD-10-CM

## 2019-04-10 DIAGNOSIS — E66.9 OBESITY, UNSPECIFIED OBESITY SEVERITY, UNSPECIFIED OBESITY TYPE: ICD-10-CM

## 2019-04-10 DIAGNOSIS — F41.1 GAD (GENERALIZED ANXIETY DISORDER): ICD-10-CM

## 2019-04-10 DIAGNOSIS — I10 BENIGN ESSENTIAL HYPERTENSION: ICD-10-CM

## 2019-04-10 LAB
ALBUMIN SERPL-MCNC: 3.8 G/DL (ref 3.4–5)
ALP SERPL-CCNC: 70 U/L (ref 40–150)
ALT SERPL W P-5'-P-CCNC: 26 U/L (ref 0–50)
ANION GAP SERPL CALCULATED.3IONS-SCNC: 8 MMOL/L (ref 3–14)
AST SERPL W P-5'-P-CCNC: 17 U/L (ref 0–45)
BILIRUB SERPL-MCNC: 0.5 MG/DL (ref 0.2–1.3)
BUN SERPL-MCNC: 11 MG/DL (ref 7–30)
CALCIUM SERPL-MCNC: 9.2 MG/DL (ref 8.5–10.1)
CHLORIDE SERPL-SCNC: 108 MMOL/L (ref 94–109)
CHOLEST SERPL-MCNC: 165 MG/DL
CO2 SERPL-SCNC: 24 MMOL/L (ref 20–32)
CREAT SERPL-MCNC: 0.69 MG/DL (ref 0.52–1.04)
DEPRECATED CALCIDIOL+CALCIFEROL SERPL-MC: 27 UG/L (ref 20–75)
FERRITIN SERPL-MCNC: 27 NG/ML (ref 12–150)
GFR SERPL CREATININE-BSD FRML MDRD: >90 ML/MIN/{1.73_M2}
GLUCOSE SERPL-MCNC: 94 MG/DL (ref 70–99)
HDLC SERPL-MCNC: 52 MG/DL
LDLC SERPL CALC-MCNC: 92 MG/DL
NONHDLC SERPL-MCNC: 113 MG/DL
POTASSIUM SERPL-SCNC: 4.1 MMOL/L (ref 3.4–5.3)
PROT SERPL-MCNC: 7.4 G/DL (ref 6.8–8.8)
SODIUM SERPL-SCNC: 140 MMOL/L (ref 133–144)
TRIGL SERPL-MCNC: 107 MG/DL
TSH SERPL DL<=0.005 MIU/L-ACNC: 1.55 MU/L (ref 0.4–4)

## 2019-04-10 PROCEDURE — 82728 ASSAY OF FERRITIN: CPT | Performed by: FAMILY MEDICINE

## 2019-04-10 PROCEDURE — 80061 LIPID PANEL: CPT | Performed by: FAMILY MEDICINE

## 2019-04-10 PROCEDURE — 80053 COMPREHEN METABOLIC PANEL: CPT | Performed by: FAMILY MEDICINE

## 2019-04-10 PROCEDURE — 82306 VITAMIN D 25 HYDROXY: CPT | Performed by: FAMILY MEDICINE

## 2019-04-10 PROCEDURE — 36415 COLL VENOUS BLD VENIPUNCTURE: CPT | Performed by: FAMILY MEDICINE

## 2019-04-10 PROCEDURE — 84443 ASSAY THYROID STIM HORMONE: CPT | Performed by: FAMILY MEDICINE

## 2019-04-16 ENCOUNTER — OFFICE VISIT (OUTPATIENT)
Dept: FAMILY MEDICINE | Facility: CLINIC | Age: 24
End: 2019-04-16
Payer: COMMERCIAL

## 2019-04-16 VITALS
BODY MASS INDEX: 46.37 KG/M2 | DIASTOLIC BLOOD PRESSURE: 88 MMHG | HEART RATE: 78 BPM | TEMPERATURE: 98.6 F | OXYGEN SATURATION: 98 % | SYSTOLIC BLOOD PRESSURE: 136 MMHG | WEIGHT: 278.3 LBS | HEIGHT: 65 IN

## 2019-04-16 DIAGNOSIS — I10 ESSENTIAL HYPERTENSION: ICD-10-CM

## 2019-04-16 PROCEDURE — 99213 OFFICE O/P EST LOW 20 MIN: CPT | Performed by: FAMILY MEDICINE

## 2019-04-16 RX ORDER — METOPROLOL SUCCINATE 50 MG/1
50 TABLET, EXTENDED RELEASE ORAL DAILY
Qty: 90 TABLET | Refills: 2 | Status: SHIPPED | OUTPATIENT
Start: 2019-04-16 | End: 2020-02-03

## 2019-04-16 ASSESSMENT — MIFFLIN-ST. JEOR: SCORE: 2010.3

## 2019-04-16 NOTE — PROGRESS NOTES
SUBJECTIVE:   Bisi Prasad is a 23 year old female who presents to clinic today for the following   health issues:      Hypertension Follow-up      Outpatient blood pressures are being checked at home.  Results are lower, but still higher normal.    Low Salt Diet: somewhat, tracking food, making more meals at home      Amount of exercise or physical activity: 3 x week 30-60 min    Problems taking medications regularly: Yes,  A few times, and out of med for about two days    Medication side effects: possible increase in anxiety sx    Diet: regular (no restrictions)      Tolerating the toprol XL 50mg daily   Ran out 2 days prior   No side effects other than mild dizziness once or twice since starting  No worsening depression on med  No issues working out   Did check BP once on med - 130's/80's  -------------------------------------    Additional history: as documented    Reviewed  and updated as needed this visit by clinical staff         Reviewed and updated as needed this visit by Provider         Patient Active Problem List   Diagnosis     Rosacea     KP (keratosis pilaris)     CARDIOVASCULAR SCREENING; LDL GOAL LESS THAN 130     Obesity, unspecified obesity severity, unspecified obesity type     BERTHA (generalized anxiety disorder)     Recurrent major depressive disorder, in remission (H)     PTSD (post-traumatic stress disorder)     Morbid obesity (H)     IUD (intrauterine device) in place     Benign essential hypertension     Past Surgical History:   Procedure Laterality Date     NO HISTORY OF SURGERY         Social History     Tobacco Use     Smoking status: Never Smoker     Smokeless tobacco: Never Used   Substance Use Topics     Alcohol use: Yes     Comment: less than once a week      Family History   Problem Relation Age of Onset     Hypertension Mother      Obesity Mother      Cardiovascular Father      Depression Father      Obesity Father      Depression Brother      Depression Sister      Breast  "Cancer No family hx of      Cancer - colorectal No family hx of      Prostate Cancer No family hx of      Diabetes No family hx of      Coronary Artery Disease No family hx of      Hyperlipidemia No family hx of      Cerebrovascular Disease No family hx of      Colon Cancer No family hx of      Thyroid Disease No family hx of      Genetic Disorder No family hx of            ROS:  Constitutional, HEENT, cardiovascular, pulmonary, GI, , musculoskeletal, neuro, skin, endocrine and psych systems are negative, except as otherwise noted.    OBJECTIVE:     /88   Pulse 78   Temp 98.6  F (37  C) (Oral)   Ht 1.638 m (5' 4.5\")   Wt 126.2 kg (278 lb 4.8 oz)   SpO2 98%   BMI 47.03 kg/m    Body mass index is 47.03 kg/m .  GENERAL: healthy, alert and no distress  CV: regular rate and rhythm, normal S1 S2, no S3 or S4, no murmur, click or rub, no peripheral edema and peripheral pulses strong    Diagnostic Test Results:  none     ASSESSMENT/PLAN:     Hypertension; controlled   Associated with the following complications:    None   Plan:  Medications:     Beta-blocker - toprol XL 50mg daily       1. Essential hypertension  BP well controlled on toprol XL  Plan to refill until next fall - RTC 6 months for physical  - metoprolol succinate ER (TOPROL-XL) 50 MG 24 hr tablet; Take 1 tablet (50 mg) by mouth daily  Dispense: 90 tablet; Refill: 2    Pt will call or RTC if symptoms worsen or do not improve.      Florecita Silva, [facility]        "

## 2019-04-16 NOTE — NURSING NOTE
"Chief Complaint   Patient presents with     Hypertension     BP (!) 133/93   Pulse 78   Temp 98.6  F (37  C) (Oral)   Ht 1.638 m (5' 4.5\")   Wt 126.2 kg (278 lb 4.8 oz)   SpO2 98%   BMI 47.03 kg/m   Estimated body mass index is 47.03 kg/m  as calculated from the following:    Height as of this encounter: 1.638 m (5' 4.5\").    Weight as of this encounter: 126.2 kg (278 lb 4.8 oz).        Health Maintenance due pending provider review:  BP was high, used pink card, recheck manually    n/a    Monique Walden CMA  "

## 2019-06-10 ENCOUNTER — NURSE TRIAGE (OUTPATIENT)
Dept: FAMILY MEDICINE | Facility: CLINIC | Age: 24
End: 2019-06-10

## 2019-06-10 ENCOUNTER — TELEPHONE (OUTPATIENT)
Dept: FAMILY MEDICINE | Facility: CLINIC | Age: 24
End: 2019-06-10

## 2019-06-10 NOTE — TELEPHONE ENCOUNTER
Additional Information    Negative: Difficult to awaken or acting confused (e.g., disoriented, slurred speech)    Negative: New neurologic deficit that is present NOW, sudden onset of ANY of the following: * Weakness of the face, arm, or leg on one side of the body * Numbness of the face, arm, or leg on one side of the body * Loss of speech or garbled speech    Negative: Sounds like a life-threatening emergency to the triager    Negative: Confusion, disorientation, or hallucinations is the main symptom    Negative: Dizziness is the main symptom    Negative: Followed a head injury within last 3 days    Negative: Headache (with neurologic deficit)    Negative: Unable to urinate (or only a few drops) and bladder feels very full    Negative: Loss of control of bowel or bladder (i.e., incontinence) of new onset    Negative: Back pain with numbness (loss of sensation) in groin or rectal area    Negative: Patient sounds very sick or weak to the triager    Negative: Neurologic deficit that was brief (now gone), ANY of the following: * Weakness of the face, arm, or leg on one side of the body * Numbness of the face, arm, or leg on one side of the body * Loss of speech or garbled speech    Negative: Neurologic deficit of gradual onset, ANY of the following: * Weakness of the face, arm, or leg on one side of the body * Numbness of the face, arm, or leg on one side of the body * Loss of speech or garbled speech    Negative: [place] palsy suspected (i.e., weakness only one side of the face, developing over hours to days, no other symptoms)    Negative: Tingling (e.g., pins and needles) of the face, arm or leg on one side of the body, that is  present now    Negative: Neck pain (with neurologic deficit)    Negative: Back pain (with neurologic deficit)    Negative: Patient wants to be seen    Negative: Loss of speech or garbled speech is a chronic symptom (recurrent or ongoing problem lasting > 4 weeks)    Negative: Weakness of  "arm or leg is a chronic symptom (recurrent or ongoing problem lasting > 4 weeks)    Negative: Numbness or tingling in one or both hands is a chronic symptom (recurrent or ongoing problem lasting > 4 weeks)    Negative: Numbness or tingling in one or both feet is a chronic symptom (recurrent or ongoing problem lasting > 4 weeks)    Negative: Numbness or tingling on both sides of body and is a new symptom lasting > 24 hours    Negative: Brief (now gone) tingling in hand (e.g., pins and needles) after prolonged laying on arm    Negative: Brief (now gone) tingling in foot (e.g., pins and needles) after prolonged sitting with legs crossed    Negative: Brief (now gone) numbness (or tingling or burning) in hand and fingers after bumped elbow    Answer Assessment - Initial Assessment Questions  1. SYMPTOM: \"What is the main symptom you are concerned about?\" (e.g., weakness, numbness)      Numbness to (R) thigh  2. ONSET: \"When did this start?\" (minutes, hours, days; while sleeping)      4 days  3. LAST NORMAL: \"When was the last time you were normal (no symptoms)?\"        4. PATTERN \"Does this come and go, or has it been constant since it started?\"  \"Is it present now?\"      Comes and goes (sometimes doesn't notice)  5. CARDIAC SYMPTOMS: \"Have you had any of the following symptoms: chest pain, difficulty breathing, palpitations?\"      No  6. NEUROLOGIC SYMPTOMS: \"Have you had any of the following symptoms: headache, dizziness, vision loss, double vision, changes in speech, unsteady on your feet?\"      Minor headache (headaches typical for her)   7. OTHER SYMPTOMS: \"Do you have any other symptoms?\"      Twitched at first - now not switching  States she had a bruise on (R) thigh too for 2-3 weeks - finally just went away  Hurt her (L) ankle - bearing weight on (R) leg  8. PREGNANCY: \"Is there any chance you are pregnant?\" \"When was your last menstrual period?\"      No    Protocols used: NEUROLOGIC DEFICIT-A-[place]    "

## 2019-06-10 NOTE — TELEPHONE ENCOUNTER
PN,     Nurse Triage SBAR    Situation: Bisi Prasad has questions about care advice given per protocol. Patient states she has had numbness to (R) thigh for 4 days and requesting/needing  your opinion.    Background: Patient has had numbness to (R) thigh for 4 days. States it does not tingle or feel similar to feeling when lay on an arm or leg too long. Worried because she has been moving for the past two weeks. Was not regularly taking her Metoprolol because of that - has anxiety too, so it worried about a potential clot/blood vessel issue. Has been back on Metoprolol for full week now. Thigh had bruise present for ~2-3 wks, now gone. Thigh is not red, warm, etc.      Assessment: Patient denies SOB, numbness to leg below thigh. Has had minor headaches - these are typical for her though. No dizziness, or other concerning symptoms    (See information below for more triage details.)    Recommendation: Routing to provider for recommendation on numbness. Patient states she has anxiety - wants to make sure you aren't concerned    Protocol Recommended Disposition: Telephone advice    Nurse Triage Follow Up: Patient informed of recommendations and reasons to call back or seek care in the Clinic. Patient verbalized understanding and agrees with the plan.

## 2019-06-10 NOTE — TELEPHONE ENCOUNTER
Left message for patient to callback.  Triage FYI - flipped this to triage encounter already   Tavia STEVENS RN

## 2019-06-10 NOTE — TELEPHONE ENCOUNTER
Reason for call:  Patient reporting a symptom    Symptom or request: numbness in right leg, able to move it    Duration (how long have symptoms been present): 4 days    Have you been treated for this before? No    Additional comments:     Phone Number patient can be reached at:  Home number on file [phone] (home)    Best Time:  any    Can we leave a detailed message on this number:  YES    Call taken on 6/10/2019 at 2:37 PM by Liliya Taylor

## 2019-06-10 NOTE — TELEPHONE ENCOUNTER
Reason for Call:  Other call back    Detailed comments: Return call about the leg numbness from previous encounter    Phone Number Patient can be reached at: Cell number on file:    Telephone Information:   Mobile [phone]       Best Time: anytime    Can we leave a detailed message on this number? NO    Call taken on 6/10/2019 at 2:50 PM by Amanda Borjas

## 2019-06-11 NOTE — TELEPHONE ENCOUNTER
Please let her know the numbness can be from many things -   Based on what the note says I wonder about a little swelling and some numbness as a result of the nerve being impinged.     If not getting better in the next weeks may be good to schedule appointment.  Thanks  PN

## 2019-08-06 ENCOUNTER — VIRTUAL VISIT (OUTPATIENT)
Dept: FAMILY MEDICINE | Facility: OTHER | Age: 24
End: 2019-08-06

## 2019-08-06 NOTE — PROGRESS NOTES
"Date:   Clinician: Foster Potter  Clinician NPI: [id number]  Patient: Bisi Prasad  Patient : 1995  Patient Address: [address] [place] [id number]  Patient Phone: [phone]  Visit Protocol: URI  Patient Summary:  Bisi is a 23 year old ( : 1995 ) female who initiated a Visit for cold, sinus infection, or influenza. When asked the question \"Please sign me up to receive news, health information and promotions from [employer].\", Bisi responded \"No\".    Bisi states her symptoms started gradually 2-3 weeks ago.   Her symptoms consist of a headache and a cough. She is experiencing mild difficulty breathing with activities but can speak normally in full sentences.   Symptom details     Cough: Bisi coughs every 5-10 minutes and her cough is not more bothersome at night. Phlegm does not come into her throat when she coughs.     Headache: She states the headache is mild (1-3 on a 10 point pain scale).      Bisi denies having sore throat, malaise, facial pain or pressure, myalgias, enlarged lymph nodes, chills, fever, rhinitis, wheezing, teeth pain, nasal congestion, and ear pain. She also denies double sickening (worsening symptoms after initial improvement), having recent facial or sinus surgery in the past 60 days, and taking antibiotic medication for the symptoms.   Precipitating events  She has not recently been exposed to someone with influenza. Bisi has been in close contact with the following high risk individuals: adults 65 or older and people with asthma, heart disease or diabetes.   Pertinent medical history  Bisi does not get yeast infections when she takes antibiotics.   Weight: 298 lbs   Bisi does not smoke or use smokeless tobacco.   She denies pregnancy and denies breastfeeding. She does not menstruate.   Additional information as reported by the patient (free text): I went to urgent care a couple of weeks ago and was diagnosed with a virus when I " came in with a sore throat, severe cough, and my oxygen levels were low. It wasn't pneumonia and they said I'd get better and gave me steroids. I feel better for the most part but my cough is still terrible and it's hard to breathe without coughing. I think I strained a muscle in my side from coughing so much     MEDICATIONS: Wellbutrin XL oral, Lexapro oral, Toprol XL oral, ALLERGIES: NKDA  Clinician Response:  Dear Bisi,  Based on the information provided, you have acute bacterial sinusitis, also known as a sinus infection. Sinus infections are caused by bacteria or a virus and symptoms are almost always identical. The difference between the 2 types of infections is timing.  Sinus infections start as viral infections and symptoms improve on their own in about 7 days. If symptoms have not improved after 7 days or have even worsened, a bacterial infection may have developed.  Medication information  I am prescribing:     Amoxicillin-pot clavulanate 875-125 mg oral tablet. Take 1 tablet by mouth every 12 hours for 10 days. There are no refills with this prescription.   Yeast infections can be a common side effect of antibiotics. The most common symptom of a yeast infection is itchiness in and around the vagina. Other signs and symptoms include burning, redness, or a thick, white vaginal discharge that looks like cottage cheese and does not have a bad smell.  If you become pregnant during this course of treatment, stop taking the medication and contact your primary care provider.  Self care  The following tips will keep you as comfortable as possible while you recover:     Rest    Drink plenty of water and other liquids    Take a hot shower to loosen congestion    Take a spoonful of honey to reduce your cough     When to seek care  Please be seen in a clinic or urgent care if any of the following occur:     Symptoms do not start to improve after 3 days of treatment    New symptoms develop, or symptoms become worse      It is possible to have an allergic reaction to an antibiotic even if you have not had one in the past. If you notice a new rash, significant swelling, or difficulty breathing, stop taking this medication immediately and go to a clinic or urgent care.   Diagnosis: Acute bacterial sinusitis  Diagnosis ICD: J01.90  Prescription: amoxicillin-pot clavulanate 875-125 mg oral tablet 20 tablet, 10 days supply. Take 1 tablet by mouth every 12 hours for 10 days. Refills: 0, Refill as needed: no, Allow substitutions: yes  Pharmacy: [place] [id number] [facility] - [phone] - [address] [place] [address]

## 2020-02-02 DIAGNOSIS — I10 ESSENTIAL HYPERTENSION: ICD-10-CM

## 2020-02-03 ENCOUNTER — VIRTUAL VISIT (OUTPATIENT)
Dept: FAMILY MEDICINE | Facility: OTHER | Age: 25
End: 2020-02-03

## 2020-02-03 RX ORDER — METOPROLOL SUCCINATE 50 MG/1
TABLET, EXTENDED RELEASE ORAL
Qty: 90 TABLET | Refills: 0 | Status: SHIPPED | OUTPATIENT
Start: 2020-02-03 | End: 2020-05-05

## 2020-02-03 NOTE — TELEPHONE ENCOUNTER
Prescription approved per [facility] Refill Protocol.  Tavia STEVENS RN

## 2020-02-03 NOTE — PROGRESS NOTES
"Date: 2020 13:45:53  Clinician: Foster Potter  Clinician NPI: [id number]  Patient: Bisi Prasad  Patient : 1995  Patient Address: [address] [place] [id number]  Patient Phone: [phone]  Visit Protocol: IBS  Patient Summary:  Bisi is a 24 year old ( : 1995 ) female who initiated a Visit for evaluation of IBS. When asked the question \"Please sign me up to receive news, health information and promotions from [employer].\", Bisi responded \"No\".    In the last 3 months, she notes experiencing abdominal pain or discomfort less than one day a month. She has been experiencing discomfort or pain for less than 6 months. With regard to the abdominal pain, the patient notes:     The pain sometimes got better or stopped completely after a bowl movement    Sometimes having more frequent bowel movements after the pain started    Most of the time having less frequent bowel movements after the pain started    Sometimes having looser or less solid stools when the pain started    Never having harder stools when the pain started     In the last 3 months, she experienced the following:     Hard or lumpy stools: about 25% of the time     Loose, mushy or watery stools: about 50% of the time     Blood in the stool: never     Black stools: never     Vomited blood: never     Sharp abdominal pain     The patient denies the following: abdominal pain that interferes with sleep, diarrhea that interferes with sleep, pain with urination, feeling feverish, recent abdominal trauma or injury, increased urination frequency, unintentional weight loss, and back pain associated with stomach symptoms.   The patient has NOT modified her diet to treat these symptoms. She has also attempted the following treatments:    Probiotics (improved symptoms)    The patient has a parent, brother, or sister who has or has had Celiac disease.   The patient does not smoke or use smokeless tobacco.   She denies pregnancy and " denies breastfeeding. She does not menstruate.   Additional information as reported by the patient (free text): I've had ongoing sharp pains in my stomach since this early morning. I've tried anti-acids and not even bowel movements have been helping. I can't even eat very much- my stomach keeps churning and giving me sharp pains.     MEDICATIONS: Wellbutrin XL oral, Lexapro oral, Toprol XL oral, ALLERGIES: NKDA  Clinician Response:  Dear Bisi,  I am sorry you are not feeling well. To determine the most appropriate care for you, I would like you to be seen in person to further discuss your health history and symptoms.  You will not be charged for this Visit. Thank you for trusting us with your care.   Diagnosis: Refer for additional evaluation  Diagnosis ICD: R69  Additional Clinician Notes: Please make an appointment with a gastroenterologist

## 2020-02-03 NOTE — TELEPHONE ENCOUNTER
"Metoprolol Succinate ER 50MG  Last Written Prescription Date:  04/16/2019  Last Fill Quantity: 90,  # refills: 2   Last office visit: 4/16/2019 with prescribing provider:  Yes    Future Office Visit:      Requested Prescriptions   Pending Prescriptions Disp Refills     metoprolol succinate ER (TOPROL-XL) 50 MG 24 hr tablet [Pharmacy Med Name: METOPROLOL SUCC ER 50 MG TAB] 90 tablet 2     Sig: TAKE 1 TABLET BY MOUTH EVERY DAY       Beta-Blockers Protocol Passed - 2/2/2020 12:48 AM        Passed - Blood pressure under 140/90 in past 12 months     BP Readings from Last 3 Encounters:   04/16/19 136/88   03/13/19 137/90   11/30/18 (!) 136/91                 Passed - Patient is age 6 or older        Passed - Recent (12 mo) or future (30 days) visit within the authorizing provider's specialty     Patient has had an office visit with the authorizing provider or a provider within the authorizing providers department within the previous 12 mos or has a future within next 30 days. See \"Patient Info\" tab in inbasket, or \"Choose Columns\" in Meds & Orders section of the refill encounter.              Passed - Medication is active on med list          "

## 2020-03-01 ENCOUNTER — HEALTH MAINTENANCE LETTER (OUTPATIENT)
Age: 25
End: 2020-03-01

## 2020-07-07 ENCOUNTER — MYC MEDICAL ADVICE (OUTPATIENT)
Dept: FAMILY MEDICINE | Facility: CLINIC | Age: 25
End: 2020-07-07

## 2020-07-07 DIAGNOSIS — I10 ESSENTIAL HYPERTENSION: ICD-10-CM

## 2020-07-13 RX ORDER — METOPROLOL SUCCINATE 50 MG/1
50 TABLET, EXTENDED RELEASE ORAL DAILY
Qty: 30 TABLET | Refills: 0 | Status: SHIPPED | OUTPATIENT
Start: 2020-07-13 | End: 2020-07-14

## 2020-07-13 NOTE — TELEPHONE ENCOUNTER
Prescription approved per [facility] Refill Protocol.  Tavia STEVENS RN

## 2020-07-14 ENCOUNTER — OFFICE VISIT (OUTPATIENT)
Dept: FAMILY MEDICINE | Facility: CLINIC | Age: 25
End: 2020-07-14
Payer: COMMERCIAL

## 2020-07-14 VITALS
TEMPERATURE: 98.8 F | WEIGHT: 293 LBS | HEIGHT: 66 IN | SYSTOLIC BLOOD PRESSURE: 140 MMHG | BODY MASS INDEX: 47.09 KG/M2 | OXYGEN SATURATION: 97 % | HEART RATE: 87 BPM | DIASTOLIC BLOOD PRESSURE: 96 MMHG

## 2020-07-14 DIAGNOSIS — I10 ESSENTIAL HYPERTENSION: ICD-10-CM

## 2020-07-14 DIAGNOSIS — E66.01 MORBID OBESITY WITH BMI OF 50.0-59.9, ADULT (H): ICD-10-CM

## 2020-07-14 DIAGNOSIS — F41.1 GAD (GENERALIZED ANXIETY DISORDER): Primary | ICD-10-CM

## 2020-07-14 DIAGNOSIS — F33.40 RECURRENT MAJOR DEPRESSIVE DISORDER, IN REMISSION (H): ICD-10-CM

## 2020-07-14 LAB
ALBUMIN SERPL-MCNC: 3.5 G/DL (ref 3.4–5)
ALP SERPL-CCNC: 59 U/L (ref 40–150)
ALT SERPL W P-5'-P-CCNC: 45 U/L (ref 0–50)
ANION GAP SERPL CALCULATED.3IONS-SCNC: 7 MMOL/L (ref 3–14)
AST SERPL W P-5'-P-CCNC: 25 U/L (ref 0–45)
BILIRUB SERPL-MCNC: 0.4 MG/DL (ref 0.2–1.3)
BUN SERPL-MCNC: 9 MG/DL (ref 7–30)
CALCIUM SERPL-MCNC: 8.4 MG/DL (ref 8.5–10.1)
CHLORIDE SERPL-SCNC: 106 MMOL/L (ref 94–109)
CHOLEST SERPL-MCNC: 172 MG/DL
CO2 SERPL-SCNC: 22 MMOL/L (ref 20–32)
CREAT SERPL-MCNC: 0.59 MG/DL (ref 0.52–1.04)
FERRITIN SERPL-MCNC: 67 NG/ML (ref 12–150)
GFR SERPL CREATININE-BSD FRML MDRD: >90 ML/MIN/{1.73_M2}
GLUCOSE SERPL-MCNC: 103 MG/DL (ref 70–99)
HDLC SERPL-MCNC: 45 MG/DL
LDLC SERPL CALC-MCNC: 98 MG/DL
NONHDLC SERPL-MCNC: 127 MG/DL
POTASSIUM SERPL-SCNC: 4.1 MMOL/L (ref 3.4–5.3)
PROT SERPL-MCNC: 7.6 G/DL (ref 6.8–8.8)
SODIUM SERPL-SCNC: 137 MMOL/L (ref 133–144)
TRIGL SERPL-MCNC: 146 MG/DL

## 2020-07-14 PROCEDURE — 99214 OFFICE O/P EST MOD 30 MIN: CPT | Performed by: FAMILY MEDICINE

## 2020-07-14 PROCEDURE — 82306 VITAMIN D 25 HYDROXY: CPT | Performed by: FAMILY MEDICINE

## 2020-07-14 PROCEDURE — 82728 ASSAY OF FERRITIN: CPT | Performed by: FAMILY MEDICINE

## 2020-07-14 PROCEDURE — 80053 COMPREHEN METABOLIC PANEL: CPT | Performed by: FAMILY MEDICINE

## 2020-07-14 PROCEDURE — 80061 LIPID PANEL: CPT | Performed by: FAMILY MEDICINE

## 2020-07-14 PROCEDURE — 36415 COLL VENOUS BLD VENIPUNCTURE: CPT | Performed by: FAMILY MEDICINE

## 2020-07-14 RX ORDER — METOPROLOL SUCCINATE 50 MG/1
75 TABLET, EXTENDED RELEASE ORAL DAILY
Qty: 135 TABLET | Refills: 1 | Status: SHIPPED | OUTPATIENT
Start: 2020-07-14 | End: 2021-01-04

## 2020-07-14 ASSESSMENT — ANXIETY QUESTIONNAIRES
7. FEELING AFRAID AS IF SOMETHING AWFUL MIGHT HAPPEN: SEVERAL DAYS
2. NOT BEING ABLE TO STOP OR CONTROL WORRYING: SEVERAL DAYS
GAD7 TOTAL SCORE: 7
3. WORRYING TOO MUCH ABOUT DIFFERENT THINGS: SEVERAL DAYS
1. FEELING NERVOUS, ANXIOUS, OR ON EDGE: SEVERAL DAYS
5. BEING SO RESTLESS THAT IT IS HARD TO SIT STILL: SEVERAL DAYS
IF YOU CHECKED OFF ANY PROBLEMS ON THIS QUESTIONNAIRE, HOW DIFFICULT HAVE THESE PROBLEMS MADE IT FOR YOU TO DO YOUR WORK, TAKE CARE OF THINGS AT HOME, OR GET ALONG WITH OTHER PEOPLE: NOT DIFFICULT AT ALL
6. BECOMING EASILY ANNOYED OR IRRITABLE: SEVERAL DAYS

## 2020-07-14 ASSESSMENT — PATIENT HEALTH QUESTIONNAIRE - PHQ9
SUM OF ALL RESPONSES TO PHQ QUESTIONS 1-9: 16
5. POOR APPETITE OR OVEREATING: SEVERAL DAYS

## 2020-07-14 ASSESSMENT — MIFFLIN-ST. JEOR: SCORE: 2336.87

## 2020-07-14 NOTE — PROGRESS NOTES
Subjective     Bisi Prasad is a 24 year old female who presents to clinic today for the following health issues:    HPI       Hypertension Follow-up      Do you check your blood pressure regularly outside of the clinic? Yes     Are you following a low salt diet? No    Are your blood pressures ever more than 140 on the top number (systolic) OR more   than 90 on the bottom number (diastolic), for example 140/90? Yes      How many servings of fruits and vegetables do you eat daily?  2-3    On average, how many sweetened beverages do you drink each day (Examples: soda, juice, sweet tea, etc.  Do NOT count diet or artificially sweetened beverages)?   3 x weekly    How many days per week do you exercise enough to make your heart beat faster? 3 or less    How many minutes a day do you exercise enough to make your heart beat faster? 9 or less  How many days per week do you miss taking your medication? 1    What makes it hard for you to take your medications?  remembering to take    Past year has been extra difficult   Not checking BP at home  No symptoms she is aware of  She states her therapist passed away last year after I saw her  She is struggling and then this year COVID  Work stressful  Not exercising  Spirals mentally dealing with health issues        Patient Active Problem List   Diagnosis     Rosacea     KP (keratosis pilaris)     CARDIOVASCULAR SCREENING; LDL GOAL LESS THAN 130     Obesity, unspecified obesity severity, unspecified obesity type     BERTHA (generalized anxiety disorder)     Recurrent major depressive disorder, in remission (H)     PTSD (post-traumatic stress disorder)     Morbid obesity (H)     IUD (intrauterine device) in place     Benign essential hypertension     Past Surgical History:   Procedure Laterality Date     NO HISTORY OF SURGERY         Social History     Tobacco Use     Smoking status: Never Smoker     Smokeless tobacco: Never Used   Substance Use Topics     Alcohol use: Yes      Comment: less than once a week      Family History   Problem Relation Age of Onset     Hypertension Mother      Obesity Mother      Cardiovascular Father      Depression Father      Obesity Father      Depression Brother      Depression Sister      Breast Cancer No family hx of      Cancer - colorectal No family hx of      Prostate Cancer No family hx of      Diabetes No family hx of      Coronary Artery Disease No family hx of      Hyperlipidemia No family hx of      Cerebrovascular Disease No family hx of      Colon Cancer No family hx of      Thyroid Disease No family hx of      Genetic Disorder No family hx of            Reviewed and updated as needed this visit by Provider         Review of Systems   Constitutional, HEENT, cardiovascular, pulmonary, GI, , musculoskeletal, neuro, skin, endocrine and psych systems are negative, except as otherwise noted.      Objective    There were no vitals taken for this visit.  There is no height or weight on file to calculate BMI.  Physical Exam   GENERAL: alert and no distress  RESP: lungs clear to auscultation - no rales, rhonchi or wheezes  CV: regular rate and rhythm, normal S1 S2, no S3 or S4, no murmur, click or rub, no peripheral edema and peripheral pulses strong  PSYCH: mentation appears normal, affect flat and judgement and insight intact    Diagnostic Test Results:  Labs reviewed in Epic        Assessment & Plan     1. Essential hypertension  Elevated BP  Discussed increasing her med from 50mg up to 75mg  Will see how she does over the next few weeks  Goal BP is <140/90  Discussed exercise and diet  - metoprolol succinate ER (TOPROL-XL) 50 MG 24 hr tablet; Take 1.5 tablets (75 mg) by mouth daily  Dispense: 135 tablet; Refill: 1  - Comprehensive metabolic panel (BMP + Alb, Alk Phos, ALT, AST, Total. Bili, TP)  - Lipid panel reflex to direct LDL Fasting    2. BERTHA (generalized anxiety disorder)     - Comprehensive metabolic panel (BMP + Alb, Alk Phos, ALT, AST,  "Total. Bili, TP)  - Vitamin D Deficiency  - Ferritin    3. Recurrent major depressive disorder, in remission (H)  Compounding the HTN and weight issues  Working with psychiatrist  - Comprehensive metabolic panel (BMP + Alb, Alk Phos, ALT, AST, Total. Bili, TP)  - Vitamin D Deficiency  - Ferritin     BMI:   Estimated body mass index is 57.01 kg/m  as calculated from the following:    Height as of this encounter: 1.664 m (5' 5.5\").    Weight as of this encounter: 157.8 kg (347 lb 14.4 oz).   Weight management plan: Discussed healthy diet and exercise guidelines      No follow-ups on file.    Florecita Silva, DO  [facility]    "

## 2020-07-14 NOTE — LETTER
My Depression Action Plan  Name: Bisi Prasad   Date of Birth 1995  Date: 7/14/2020    My doctor: Florecita Silva   My clinic: [facility]  [address] [address] [phone]          GREEN    ZONE   Good Control    What it looks like:     Things are going generally well. You have normal ups and downs. You may even feel depressed from time to time, but bad moods usually last less than a day.   What you need to do:  1. Continue to care for yourself (see self care plan)  2. Check your depression survival kit and update it as needed  3. Follow your physician s recommendations including any medication.  4. Do not stop taking medication unless you consult with your physician first.           YELLOW         ZONE Getting Worse    What it looks like:     Depression is starting to interfere with your life.     It may be hard to get out of bed; you may be starting to isolate yourself from others.    Symptoms of depression are starting to last most all day and this has happened for several days.     You may have suicidal thoughts but they are not constant.   What you need to do:     1. Call your care team. Your response to treatment will improve if you keep your care team informed of your progress. Yellow periods are signs an adjustment may need to be made.     2. Continue your self-care.  Just get dressed and ready for the day.  Don't give yourself time to talk yourself out of it.    3. Talk to someone in your support network.    4. Open up your Depression Self-Care Plan/Wellness Kit.           RED    ZONE Medical Alert - Get Help    What it looks like:     Depression is seriously interfering with your life.     You may experience these or other symptoms: You can t get out of bed most days, can t work or engage in other necessary activities, you have trouble taking care of basic hygiene, or basic responsibilities, thoughts of suicide or death that will not go  away, self-injurious behavior.     What you need to do:  1. Call your care team and request a same-day appointment. If they are not available (weekends or after hours) call your local crisis line, emergency room or 911.            Depression Self-Care Plan / Wellness Kit    Self-Care for Depression  Here s the deal. Your body and mind are really not as separate as most people think.  What you do and think affects how you feel and how you feel influences what you do and think. This means if you do things that people who feel good do, it will help you feel better.  Sometimes this is all it takes.  There is also a place for medication and therapy depending on how severe your depression is, so be sure to consult with your medical provider and/ or Behavioral Health Consultant if your symptoms are worsening or not improving.     In order to better manage my stress, I will:    Exercise  Get some form of exercise, every day. This will help reduce pain and release endorphins, the  feel good  chemicals in your brain. This is almost as good as taking antidepressants!  This is not the same as joining a gym and then never going! (they count on that by the way ) It can be as simple as just going for a walk or doing some gardening, anything that will get you moving.      Hygiene   Maintain good hygiene (get out of bed in the morning, make your bed, brush your teeth, take a shower, and get dressed like you were going to work, even if you are unemployed).  If your clothes don't fit try to get ones that do.    Diet  Strive to eat foods that are good for me, drink plenty of water, and avoid excessive sugar, caffeine, alcohol, and other mood-altering substances.  Some foods that are helpful in depression are: complex carbohydrates, B vitamins, flaxseed, fish or fish oil, fresh fruits and vegetables.    Psychotherapy  Agree to participate in Individual Therapy (if recommended).    Medication  If prescribed medications, I agree to take  them.  Missing doses can result in serious side effects.  I understand that drinking alcohol, or other illicit drug use, may cause potential side effects.  I will not stop my medication abruptly without first discussing it with my provider.    Staying Connected With Others  Stay in touch with my friends, family members, and my primary care provider/team.    Use your imagination  Be creative.  We all have a creative side; it doesn t matter if it s oil painting, sand castles, or mud pies! This will also kick up the endorphins.    Witness Beauty  (AKA stop and smell the roses) Take a look outside, even in mid-winter. Notice colors, textures. Watch the squirrels and birds.     Service to others  Be of service to others.  There is always someone else in need.  By helping others we can  get out of ourselves  and remember the really important things.  This also provides opportunities for practicing all the other parts of the program.    Humor  Laugh and be silly!  Adjust your TV habits for less news and crime-drama and more comedy.    Control your stress  Try breathing deep, massage therapy, biofeedback, and meditation. Find time to relax each day.     Crisis Text Line  http://www.crisistextline.org    The Crisis Text Line serves anyone, in any type of crisis, providing access to free, 24/7 support and information via the medium people already use and trust:    Here's how it works:  1.  Text 048-806 from anywhere in the [place], anytime, about any type of crisis.  2.  A live, trained Crisis Counselor receives the text and responds quickly.  3.  The volunteer Crisis Counselor will help you move from a 'hot moment to a cool moment'.    My support system    Clinic Contact:  Phone number:    Contact 1:  Phone number:    Contact 2:  Phone number:    Taoist/:  Phone number:    Therapist:  Phone number:    Local crisis center:    Phone number:    Other community support:  Phone number:

## 2020-07-15 LAB — DEPRECATED CALCIDIOL+CALCIFEROL SERPL-MC: 17 UG/L (ref 20–75)

## 2020-07-15 ASSESSMENT — ANXIETY QUESTIONNAIRES: GAD7 TOTAL SCORE: 7

## 2020-07-19 NOTE — RESULT ENCOUNTER NOTE
Dear Bisi,   Your test results are all back -   -All of your labs are normal except the vitamin D  Your level is low.  I would recommend starting an over the counter vitamin D - 50mcg daily.  Plan to recheck in 2-3 months.  Let us know if you have any questions.  -Florecita Silva, DO

## 2020-07-30 DIAGNOSIS — R06.83 SNORING: Primary | ICD-10-CM

## 2020-07-30 DIAGNOSIS — R51.9 HEADACHE: ICD-10-CM

## 2020-11-21 ENCOUNTER — MYC MEDICAL ADVICE (OUTPATIENT)
Dept: FAMILY MEDICINE | Facility: CLINIC | Age: 25
End: 2020-11-21

## 2020-12-14 ENCOUNTER — HEALTH MAINTENANCE LETTER (OUTPATIENT)
Age: 25
End: 2020-12-14

## 2021-01-04 DIAGNOSIS — I10 ESSENTIAL HYPERTENSION: ICD-10-CM

## 2021-01-04 NOTE — TELEPHONE ENCOUNTER
Routing refill request to provider for review/approval because:  Labs out of range:  BP  Please authorize if appropriate.  Thanks,  Sarah Best RN

## 2021-01-05 RX ORDER — METOPROLOL SUCCINATE 50 MG/1
TABLET, EXTENDED RELEASE ORAL
Qty: 135 TABLET | Refills: 0 | Status: SHIPPED | OUTPATIENT
Start: 2021-01-05 | End: 2021-03-15

## 2021-04-17 ENCOUNTER — HEALTH MAINTENANCE LETTER (OUTPATIENT)
Age: 26
End: 2021-04-17

## 2021-04-22 ENCOUNTER — MYC MEDICAL ADVICE (OUTPATIENT)
Dept: FAMILY MEDICINE | Facility: CLINIC | Age: 26
End: 2021-04-22

## 2021-05-17 NOTE — PROGRESS NOTES
SUBJECTIVE:   CC: Bisi Prasad is an 25 year old woman who presents for preventive health visit.       Patient has been advised of split billing requirements and indicates understanding: Yes  Healthy Habits:     Getting at least 3 servings of Calcium per day:  Yes    Bi-annual eye exam:  Yes    Dental care twice a year:  Yes    Sleep apnea or symptoms of sleep apnea:  None    Diet:  Regular (no restrictions)    Frequency of exercise:  None    Taking medications regularly:  No    Barriers to taking medications:  Other    Medication side effects:  None    PHQ-2 Total Score: 3    Additional concerns today:  No          -------------------------------------    Today's PHQ-2 Score:   PHQ-2 ( 1999 Pf[employer]) 5/18/2021   Q1: Little interest or pleasure in doing things 1   Q2: Feeling down, depressed or hopeless 2   PHQ-2 Score 3   Q1: Little interest or pleasure in doing things Several days   Q2: Feeling down, depressed or hopeless More than half the days   PHQ-2 Score 3       Abuse: Current or Past (Physical, Sexual or Emotional) - No  Do you feel safe in your environment? Yes    Have you ever done Advance Care Planning? (For example, a Health Directive, POLST, or a discussion with a medical provider or your loved ones about your wishes): No, advance care planning information given to patient to review.  Patient plans to discuss their wishes with loved ones or provider.      Social History     Tobacco Use     Smoking status: Never Smoker     Smokeless tobacco: Never Used   Substance Use Topics     Alcohol use: Yes     Comment: less than once a week      If you drink alcohol do you typically have >3 drinks per day or >7 drinks per week? No    Alcohol Use 5/18/2021   Prescreen: >3 drinks/day or >7 drinks/week? No   Prescreen: >3 drinks/day or >7 drinks/week? -   No flowsheet data found.    Reviewed orders with patient.  Reviewed health maintenance and updated orders accordingly - Yes  Patient Active Problem List    Diagnosis     Rosacea     KP (keratosis pilaris)     CARDIOVASCULAR SCREENING; LDL GOAL LESS THAN 130     BERTHA (generalized anxiety disorder)     Recurrent major depressive disorder, in remission (H)     PTSD (post-traumatic stress disorder)     BMI 57     IUD (intrauterine device) in place     Benign essential hypertension     Past Surgical History:   Procedure Laterality Date     NO HISTORY OF SURGERY         Social History     Tobacco Use     Smoking status: Never Smoker     Smokeless tobacco: Never Used   Substance Use Topics     Alcohol use: Yes     Comment: less than once a week      Family History   Problem Relation Age of Onset     Hypertension Mother      Obesity Mother      Cardiovascular Father      Depression Father      Obesity Father      Depression Brother      Depression Sister      Breast Cancer No family hx of      Cancer - colorectal No family hx of      Prostate Cancer No family hx of      Diabetes No family hx of      Coronary Artery Disease No family hx of      Hyperlipidemia No family hx of      Cerebrovascular Disease No family hx of      Colon Cancer No family hx of      Thyroid Disease No family hx of      Genetic Disorder No family hx of            Breast Cancer Screening:  Any new diagnosis of family breast, ovarian, or bowel cancer? No    FSH-7: No flowsheet data found.    Patient under 40 years of age: Routine Mammogram Screening not recommended.   Pertinent mammograms are reviewed under the imaging tab.    History of abnormal Pap smear: NO - age 21-29 PAP every 3 years recommended  PAP / HPV 8/3/2017   PAP NIL     Reviewed and updated as needed this visit by clinical staff   Allergies  Meds              Reviewed and updated as needed this visit by Provider                    Review of Systems  CONSTITUTIONAL: NEGATIVE for fever, chills, change in weight  INTEGUMENTARU/SKIN: NEGATIVE for worrisome rashes, moles or lesions  EYES: NEGATIVE for vision changes or irritation  ENT:  NEGATIVE for ear, mouth and throat problems  RESP: NEGATIVE for significant cough or SOB  BREAST: NEGATIVE for masses, tenderness or discharge  CV: NEGATIVE for chest pain, palpitations or peripheral edema  GI: NEGATIVE for nausea, abdominal pain, heartburn, or change in bowel habits  : NEGATIVE for unusual urinary or vaginal symptoms. Periods are regular.  MUSCULOSKELETAL: NEGATIVE for significant arthralgias or myalgia  NEURO: NEGATIVE for weakness, dizziness or paresthesias  PSYCHIATRIC: NEGATIVE for changes in mood or affect     OBJECTIVE:   There were no vitals taken for this visit.  Physical Exam  GENERAL: alert, no distress and obese  EYES: Eyes grossly normal to inspection, PERRL and conjunctivae and sclerae normal  HENT: ear canals and TM's normal, nose and mouth without ulcers or lesions  NECK: no adenopathy, no asymmetry, masses, or scars and thyroid normal to palpation  RESP: lungs clear to auscultation - no rales, rhonchi or wheezes  BREAST: normal without masses, tenderness or nipple discharge and no palpable axillary masses or adenopathy  CV: regular rate and rhythm, normal S1 S2, no S3 or S4, no murmur, click or rub, no peripheral edema and peripheral pulses strong  ABDOMEN: soft, nontender, no hepatosplenomegaly, no masses and bowel sounds normal   (female): normal female external genitalia, normal urethral meatus, vaginal mucosa pink, moist, well rugated, and normal cervix but only able to visualize the upper part of the cervix due to body habitus  MS: no gross musculoskeletal defects noted, no edema  SKIN: no suspicious lesions or rashes  NEURO: Normal strength and tone, mentation intact and speech normal  PSYCH: mentation appears normal, affect normal/bright    Diagnostic Test Results:  Labs reviewed in Epic    ASSESSMENT/PLAN:   1. Routine general medical examination at a health care facility  Routine screening  - Pap imaged thin layer screen reflex to HPV if ASCUS - recommend age 25 - 29  -  "Lipid panel reflex to direct LDL Fasting  - Comprehensive metabolic panel (BMP + Alb, Alk Phos, ALT, AST, Total. Bili, TP)  - Ferritin  - HIV Antigen Antibody Combo  - Hepatitis C antibody    2. Essential hypertension  BP not controlled today   Discussed diet and exercise  Will stop her metoprolol as she is having more depression  Will try losartan 50mg daily  F/u 4-6 weeks for recheck   - Comprehensive metabolic panel (BMP + Alb, Alk Phos, ALT, AST, Total. Bili, TP)  - Ferritin  - losartan (COZAAR) 50 MG tablet; Take 1 tablet (50 mg) by mouth daily  Dispense: 30 tablet; Refill: 1    3. Recurrent major depressive disorder, in remission (H)  Working with psychiatry - has been difficult year  - Vitamin D Deficiency    4. IUD (intrauterine device) in place       5. Migraine without status migrainosus, not intractable, unspecified migraine type  Refilled   - SUMAtriptan (IMITREX) 50 MG tablet; Take 1 tablet (50 mg) by mouth at onset of headache for migraine May repeat in 2 hours. Max 4 tablets/24 hours.  Dispense: 6 tablet; Refill: 0    Patient has been advised of split billing requirements and indicates understanding: Yes  COUNSELING:  Reviewed preventive health counseling, as reflected in patient instructions    Estimated body mass index is 57.01 kg/m  as calculated from the following:    Height as of 7/14/20: 1.664 m (5' 5.5\").    Weight as of 7/14/20: 157.8 kg (347 lb 14.4 oz).    Weight management plan: Discussed healthy diet and exercise guidelines    She reports that she has never smoked. She has never used smokeless tobacco.      Counseling Resources:  ATP IV Guidelines  Pooled Cohorts Equation Calculator  Breast Cancer Risk Calculator  BRCA-Related Cancer Risk Assessment: FHS-7 Tool  FRAX Risk Assessment  ICSI Preventive Guidelines  Dietary Guidelines for Americans, 2010  USDA's MyPlate  ASA Prophylaxis  Lung CA Screening    Florecita Silva, [facility]  "

## 2021-05-18 ENCOUNTER — OFFICE VISIT (OUTPATIENT)
Dept: FAMILY MEDICINE | Facility: CLINIC | Age: 26
End: 2021-05-18
Payer: COMMERCIAL

## 2021-05-18 VITALS
RESPIRATION RATE: 17 BRPM | HEIGHT: 65 IN | SYSTOLIC BLOOD PRESSURE: 143 MMHG | WEIGHT: 293 LBS | BODY MASS INDEX: 48.82 KG/M2 | HEART RATE: 79 BPM | DIASTOLIC BLOOD PRESSURE: 99 MMHG | OXYGEN SATURATION: 94 %

## 2021-05-18 DIAGNOSIS — G43.909 MIGRAINE WITHOUT STATUS MIGRAINOSUS, NOT INTRACTABLE, UNSPECIFIED MIGRAINE TYPE: ICD-10-CM

## 2021-05-18 DIAGNOSIS — Z97.5 IUD (INTRAUTERINE DEVICE) IN PLACE: ICD-10-CM

## 2021-05-18 DIAGNOSIS — Z00.00 ROUTINE GENERAL MEDICAL EXAMINATION AT A HEALTH CARE FACILITY: Primary | ICD-10-CM

## 2021-05-18 DIAGNOSIS — F33.40 RECURRENT MAJOR DEPRESSIVE DISORDER, IN REMISSION (H): ICD-10-CM

## 2021-05-18 DIAGNOSIS — I10 ESSENTIAL HYPERTENSION: ICD-10-CM

## 2021-05-18 LAB
ALBUMIN SERPL-MCNC: 3.7 G/DL (ref 3.4–5)
ALP SERPL-CCNC: 61 U/L (ref 40–150)
ALT SERPL W P-5'-P-CCNC: 38 U/L (ref 0–50)
ANION GAP SERPL CALCULATED.3IONS-SCNC: 8 MMOL/L (ref 3–14)
AST SERPL W P-5'-P-CCNC: 20 U/L (ref 0–45)
BILIRUB SERPL-MCNC: 0.4 MG/DL (ref 0.2–1.3)
BUN SERPL-MCNC: 11 MG/DL (ref 7–30)
CALCIUM SERPL-MCNC: 9.4 MG/DL (ref 8.5–10.1)
CHLORIDE SERPL-SCNC: 104 MMOL/L (ref 94–109)
CHOLEST SERPL-MCNC: 179 MG/DL
CO2 SERPL-SCNC: 25 MMOL/L (ref 20–32)
CREAT SERPL-MCNC: 0.66 MG/DL (ref 0.52–1.04)
DEPRECATED CALCIDIOL+CALCIFEROL SERPL-MC: 18 UG/L (ref 20–75)
FERRITIN SERPL-MCNC: 91 NG/ML (ref 12–150)
GFR SERPL CREATININE-BSD FRML MDRD: >90 ML/MIN/{1.73_M2}
GLUCOSE SERPL-MCNC: 99 MG/DL (ref 70–99)
HCV AB SERPL QL IA: NONREACTIVE
HDLC SERPL-MCNC: 48 MG/DL
HIV 1+2 AB+HIV1 P24 AG SERPL QL IA: NONREACTIVE
LDLC SERPL CALC-MCNC: 92 MG/DL
NONHDLC SERPL-MCNC: 131 MG/DL
POTASSIUM SERPL-SCNC: 4.2 MMOL/L (ref 3.4–5.3)
PROT SERPL-MCNC: 7.8 G/DL (ref 6.8–8.8)
SODIUM SERPL-SCNC: 137 MMOL/L (ref 133–144)
TRIGL SERPL-MCNC: 197 MG/DL

## 2021-05-18 PROCEDURE — 99395 PREV VISIT EST AGE 18-39: CPT | Performed by: FAMILY MEDICINE

## 2021-05-18 PROCEDURE — 86803 HEPATITIS C AB TEST: CPT | Performed by: FAMILY MEDICINE

## 2021-05-18 PROCEDURE — G0145 SCR C/V CYTO,THINLAYER,RESCR: HCPCS | Performed by: FAMILY MEDICINE

## 2021-05-18 PROCEDURE — 36415 COLL VENOUS BLD VENIPUNCTURE: CPT | Performed by: FAMILY MEDICINE

## 2021-05-18 PROCEDURE — 82306 VITAMIN D 25 HYDROXY: CPT | Performed by: FAMILY MEDICINE

## 2021-05-18 PROCEDURE — 82728 ASSAY OF FERRITIN: CPT | Performed by: FAMILY MEDICINE

## 2021-05-18 PROCEDURE — 87389 HIV-1 AG W/HIV-1&-2 AB AG IA: CPT | Performed by: FAMILY MEDICINE

## 2021-05-18 PROCEDURE — 80061 LIPID PANEL: CPT | Performed by: FAMILY MEDICINE

## 2021-05-18 PROCEDURE — 80053 COMPREHEN METABOLIC PANEL: CPT | Performed by: FAMILY MEDICINE

## 2021-05-18 RX ORDER — LOSARTAN POTASSIUM 50 MG/1
50 TABLET ORAL DAILY
Qty: 30 TABLET | Refills: 1 | Status: SHIPPED | OUTPATIENT
Start: 2021-05-18 | End: 2021-08-02

## 2021-05-18 RX ORDER — SUMATRIPTAN 50 MG/1
50 TABLET, FILM COATED ORAL
Qty: 6 TABLET | Refills: 0 | Status: SHIPPED | OUTPATIENT
Start: 2021-05-18 | End: 2022-09-23 | Stop reason: DRUGHIGH

## 2021-05-18 RX ORDER — METOPROLOL SUCCINATE 50 MG/1
TABLET, EXTENDED RELEASE ORAL
Qty: 45 TABLET | Refills: 0 | Status: CANCELLED | OUTPATIENT
Start: 2021-05-18

## 2021-05-18 ASSESSMENT — PATIENT HEALTH QUESTIONNAIRE - PHQ9
SUM OF ALL RESPONSES TO PHQ QUESTIONS 1-9: 9
SUM OF ALL RESPONSES TO PHQ QUESTIONS 1-9: 9
10. IF YOU CHECKED OFF ANY PROBLEMS, HOW DIFFICULT HAVE THESE PROBLEMS MADE IT FOR YOU TO DO YOUR WORK, TAKE CARE OF THINGS AT HOME, OR GET ALONG WITH OTHER PEOPLE: SOMEWHAT DIFFICULT

## 2021-05-18 ASSESSMENT — MIFFLIN-ST. JEOR: SCORE: 2419.08

## 2021-05-18 NOTE — RESULT ENCOUNTER NOTE
Dear Bisi,   Your test results are all back -   -All of your labs are normal except:  Cholesterol shows the triglycerides are borderline.  This will improve with exercise.  Your vitamin D is still on the low side - be sure you are getting vitamin D3 - 2000 international unit(s) daily (or 50mcg).  Let us know if you have any questions.  -Florecita Silva, DO

## 2021-05-20 LAB
COPATH REPORT: NORMAL
PAP: NORMAL

## 2021-08-05 ENCOUNTER — MYC MEDICAL ADVICE (OUTPATIENT)
Dept: FAMILY MEDICINE | Facility: CLINIC | Age: 26
End: 2021-08-05

## 2021-08-06 NOTE — TELEPHONE ENCOUNTER
I can fill them but she should see me for a visit specifically for this issue so I can get more history.  Thanks  PN

## 2021-10-02 ENCOUNTER — HEALTH MAINTENANCE LETTER (OUTPATIENT)
Age: 26
End: 2021-10-02

## 2021-11-17 ENCOUNTER — MYC MEDICAL ADVICE (OUTPATIENT)
Dept: FAMILY MEDICINE | Facility: CLINIC | Age: 26
End: 2021-11-17
Payer: COMMERCIAL

## 2021-11-18 NOTE — TELEPHONE ENCOUNTER
PN,   FYI:  See update from pt  Added one of these to pt reported vitals  Tavia STEVENS RN

## 2022-01-07 NOTE — PROGRESS NOTES
"    Assessment & Plan     Pain in both wrists  B/l wrist pain for years   Prior workup as a teen and told tenontitis  Discussed other possible etiology - carpal tunnel however no finger radiating pain or other.  Also GM has hx of rheumatoid arthritis - discussed joint swelling b/l - discussed lab check however patient would like to hold off at this time.  Advised signs and symptoms to monitor for   Note written for ergonomic equipment for work to include keyboard and mouse.    Essential hypertension  BP is elevated today -   Will increase her dose of losartan from 50mg up to 75mg -   New Rx written.  - losartan (COZAAR) 50 MG tablet; Take 1.5 tablets (75 mg) by mouth daily             BMI:   Estimated body mass index is 60.27 kg/m  as calculated from the following:    Height as of this encounter: 1.656 m (5' 5.2\").    Weight as of this encounter: 165.3 kg (364 lb 6.4 oz).           No follow-ups on file.    Florecita Silva [facility] is a 26 year old who presents for the following health issues     HPI     Answers for HPI/ROS submitted by the patient on 1/11/2022  If you checked off any problems, how difficult have these problems made it for you to do your work, take care of things at home, or get along with other people?: Somewhat difficult  PHQ9 TOTAL SCORE: 8        Pain History:  When did you first notice your pain? - Less than 1 week   Have you seen anyone else for your pain? YES  Where in your body do you have pain? Musculoskeletal problem/pain  Onset/Duration: For couple years pt was diagnosed with tendonitis when when she was younger  Description  Location: wrist - bilateral  Joint Swelling: no  Redness: no  Pain: YES  Warmth: no  Intensity:  mild  Progression of Symptoms:  improving  Accompanying signs and symptoms:   Fevers: no  Numbness/tingling/weakness: no  History  Trauma to the area: no  Recent illness:  no  Previous similar problem: YES  Previous " "evaluation:  YES  Precipitating or alleviating factors:  Aggravating factors include: overuse  Therapies tried and outcome:  Ibuprofen    First diagnosed at age 16   B/l wrist   Pain located into the wrist - wraps around the wrist   Feels like pressure and \"bruised\"  Will notice more at the end of the day and sometimes in the morning  Not sure if swelling - maybe  Some pain in knuckles -   No finger weakness   Aunt has tendonitis and GM has rheumatoid arthritis -   ROS - pt sometimes has hip issues   Currently work having more wrist pain 2021 - typing more  Switched to [employer] keyboard       Hypertension Follow-up      Do you check your blood pressure regularly outside of the clinic? Yes     Are you following a low salt diet? No    Are your blood pressures ever more than 140 on the top number (systolic) OR more   than 90 on the bottom number (diastolic), for example 140/90? Yes      How many servings of fruits and vegetables do you eat daily?  0-1    On average, how many sweetened beverages do you drink each day (Examples: soda, juice, sweet tea, etc.  Do NOT count diet or artificially sweetened beverages)?   1    How many days per week do you exercise enough to make your heart beat faster? 3 or less    How many minutes a day do you exercise enough to make your heart beat faster? 10 - 19  How many days per week do you miss taking your medication? 1    What makes it hard for you to take your medications?  remembering to take      Review of Systems   Constitutional, HEENT, cardiovascular, pulmonary, gi and gu systems are negative, except as otherwise noted.      Objective    BP (!) 140/92   Pulse 113   Temp 97  F (36.1  C)   Ht 1.656 m (5' 5.2\")   Wt (!) 165.3 kg (364 lb 6.4 oz)   SpO2 97%   BMI 60.27 kg/m    Body mass index is 60.27 kg/m .  Physical Exam   GENERAL: alert and no distress  CV: tachycardia, normal S1 S2, no S3 or S4, no murmur, click or rub and peripheral pulses strong  Recheck heart rate was " high 90's

## 2022-01-11 ENCOUNTER — OFFICE VISIT (OUTPATIENT)
Dept: FAMILY MEDICINE | Facility: CLINIC | Age: 27
End: 2022-01-11
Payer: COMMERCIAL

## 2022-01-11 VITALS
OXYGEN SATURATION: 97 % | SYSTOLIC BLOOD PRESSURE: 140 MMHG | DIASTOLIC BLOOD PRESSURE: 92 MMHG | WEIGHT: 293 LBS | BODY MASS INDEX: 48.82 KG/M2 | TEMPERATURE: 97 F | HEIGHT: 65 IN | HEART RATE: 113 BPM

## 2022-01-11 DIAGNOSIS — I10 ESSENTIAL HYPERTENSION: ICD-10-CM

## 2022-01-11 DIAGNOSIS — M25.531 PAIN IN BOTH WRISTS: Primary | ICD-10-CM

## 2022-01-11 DIAGNOSIS — M25.532 PAIN IN BOTH WRISTS: Primary | ICD-10-CM

## 2022-01-11 PROCEDURE — 99213 OFFICE O/P EST LOW 20 MIN: CPT | Performed by: FAMILY MEDICINE

## 2022-01-11 RX ORDER — ESCITALOPRAM OXALATE 10 MG/1
15 TABLET ORAL
COMMUNITY
Start: 2022-01-11 | End: 2022-09-23

## 2022-01-11 RX ORDER — LOSARTAN POTASSIUM 50 MG/1
75 TABLET ORAL DAILY
Qty: 135 TABLET | Refills: 1 | Status: SHIPPED | OUTPATIENT
Start: 2022-01-11 | End: 2022-09-23 | Stop reason: DRUGHIGH

## 2022-01-11 ASSESSMENT — PATIENT HEALTH QUESTIONNAIRE - PHQ9
SUM OF ALL RESPONSES TO PHQ QUESTIONS 1-9: 8
SUM OF ALL RESPONSES TO PHQ QUESTIONS 1-9: 8
10. IF YOU CHECKED OFF ANY PROBLEMS, HOW DIFFICULT HAVE THESE PROBLEMS MADE IT FOR YOU TO DO YOUR WORK, TAKE CARE OF THINGS AT HOME, OR GET ALONG WITH OTHER PEOPLE: SOMEWHAT DIFFICULT

## 2022-01-11 ASSESSMENT — MIFFLIN-ST. JEOR: SCORE: 2396.96

## 2022-01-11 NOTE — LETTER
January 11, 2022      Bisi Prasad  [address]  [place] [place] [facility] [address]        To Whom It May Concern:    Bisi Prsaad  was seen on January 11, 2022.  Please provide her with ergonomic equipment for bilateral wrist pain to include a keyboard and mouse.    Let us know if you have any questions.      Sincerely,        Florecita Silva, DO

## 2022-01-26 ENCOUNTER — E-VISIT (OUTPATIENT)
Dept: FAMILY MEDICINE | Facility: CLINIC | Age: 27
End: 2022-01-26
Payer: COMMERCIAL

## 2022-01-26 DIAGNOSIS — S09.90XA INJURY OF HEAD, INITIAL ENCOUNTER: Primary | ICD-10-CM

## 2022-01-26 PROCEDURE — 99421 OL DIG E/M SVC 5-10 MIN: CPT | Performed by: FAMILY MEDICINE

## 2022-01-26 NOTE — PATIENT INSTRUCTIONS
"    Self-Care for Headaches  Most headaches aren't serious and can be relieved with self-care. But some headaches may be a sign of another health problem like eye trouble or high blood pressure. To find the best treatment, learn what kind of headaches you get. For tension headaches, self-care will usually help. To treat migraines, ask your healthcare provider for advice. It's also possible to get both tension and migraine headaches. Self-care involves relieving the pain and avoiding headache \"triggers\" if you can.     Ways to reduce pain and tension  Try these steps:    Apply a cold compress or ice pack to the pain site.    Drink fluids. If nausea makes it hard to drink, try sucking on ice.    Rest. Protect yourself from bright light and loud noises.    Calm your emotions by imagining a peaceful scene.    Massage tight neck, shoulder, and head muscles.    To relax muscles, soak in a hot bath or use a hot shower.  Use medicines  Aspirin or other over-the-counter (OTC) pain medicines such as ibuprofen and acetaminophen can relieve headache. Remember: Never give aspirin to anyone 18 years old or younger because of the risk of getting Reye syndrome. Use pain medicines only when needed. Certain prescription and OTC medicines can lead to rebound headaches if they are taken too often. Check with your healthcare provider or pharmacist about your medicines.   Track your headaches  Keeping a headache diary can help you and your healthcare provider identify what's causing your headaches:     Note when each headache happens.    Identify your activities and the foods you've eaten 6 to 8 hours before the headache began.    Look for any trends or \"triggers.\"    Bring the information to your next medical appointment.  Signs of tension headache  Any of the following can be signs:     Dull pain or feeling of pressure in a tight band around your head    Pain in your neck or shoulders    Headache without a definite beginning or " "end    Headache after an activity such as driving or working on a computer  Signs of migraine  Any of the following can be signs:     Throbbing pain on one or both sides of your head    Nausea or vomiting    Extreme sensitivity to light, sound, and smells    Bright spots, flashes, or other visual changes    Pain or nausea so severe that you can't continue your daily activities  When to call your healthcare provider   Call your healthcare provider if any of these occur:     A headache that lingers after a recent injury or bump to the head    A headache that lasts for more than 3 days    Frequent headaches, especially in the morning  Get medical care right away if you have:    A headache along with slurred speech, changes in your vision, or numbness or weakness in your arms or legs    Headaches with seizures    A fever with a stiff neck or pain when you bend your head toward your chest    A headache that you would call \"the worst headache you have ever had\" or a severe, persistent headache that gets worse or doesn't get better with treatment  [employer] last reviewed this educational content on 7/1/2020 2000-2021 [employer]. All rights reserved. This information is not intended as a substitute for professional medical care. Always follow your healthcare professional's instructions.        Patient Education     First Aid: Head Injuries  A strong blow to the head may cause swelling and bleeding inside the skull. The resulting pressure can injure the brain (concussion). If you have any doubts about a concussion, have a healthcare provider check the person.   Call 911  Call 911right away if any of the following is true:     The person loses consciousness or is lethargic.    The person has convulsions or seizures.    The person has unequal pupil size. (The pupil is the black part in the center of the eye.)    The person shows any of these signs of concussion:  ? Confusion or inability to follow normal " conversation  ? Abnormal behavior  ? Slurred speech  ? Dizziness or vision problems  ? Nausea or vomiting  ? Muscle weakness or loss of mobility  ? Memory loss  ? Sensitivity to noise    A depressed or spongy area in the skull, or visible bone fragments    Clear fluid draining out of the ears or nose    Bruising behind the ears or around the eyes  While you are waiting for medical help, you can:    Reassure the person.    Treat for shock by maintaining body temperature and keeping the person calm.    Do rescue breathing or CPR, if needed.  If the person has neck or back pain or is unconscious, he or she might have a spine fracture. Move the person only with great caution and only if absolutely needed.   Step 1. Control bleeding    Apply direct pressure to control bleeding. Wear gloves or use other protection to prevent contact with victim's blood.    Wash a minor surface injury with soap and water after the bleeding stops or is reduced.    Cover the wound with a clean dressing and bandage.    Step 2. Ice bumps and bruises    Place a cold pack or ice on the injury to reduce swelling and pain. Placing a cloth between the skin and the ice pack helps prevent tissue damage from severe cold.    Step 3. Watch the person    Watch for vomiting or changes in mood or alertness. If you notice changes, call for medical help. Signs of concussion may not appear for up to 48 hours.    Tell the person's partner, parent, or roommate about the injury so they can continue to observe the person.    Stitches  If a cut is deep or continues to bleed, or the edges of skin don't stay together evenly, the wound may need to be closed with stitches, tape, staples, or medical glue. Any of these can help speed healing and reduce the risk for infection and the size of the scar. These may be especially important concerns with large wounds, and wounds on the head or other visible body parts.   If you think a wound may need medical care, see a  healthcare provider as soon as possible. If the person needs stitches, this must be done in the first few hours. A wound that is not correctly closed is at risk for serious infection.   [employer] last reviewed this educational content on 8/1/2020 2000-2021 [employer]. All rights reserved. This information is not intended as a substitute for professional medical care. Always follow your healthcare professional's instructions.           Patient Education     * Head Injury, no wake-up (Adult)    You have a head injury. It doesn t look serious right now. But symptoms of a more serious problem may appear later. This could be a mild brain injury (concussion), or bruising or bleeding in the brain. You or someone caring for you will need to watch for the symptoms listed below for at least the next 24 hours. When you get home, be sure to follow any care instructions you re given.  Home care  Watch for the following symptoms:  Call 9-1-1 if you have any of these symptoms over the next hours or days:  1. Severe headache or headache that gets worse  2. Nausea and repeated throwing up (vomiting)  3. Dizziness or changes in eyesight (vision changes)  4. Bothered by bright light or loud noise  5. Sleep changes (trouble falling asleep or unusually sleepy or groggy)  6. Changes in the way you act or talk (personality or speech changes)  7. Feeling confused or forgetting things (memory loss)  8. Trouble walking or clumsiness  9. Passing out or fainting (even for a short time)  10. Won t wake up  11. Stiff neck  12. Weakness or numbness in any part of the body  13. Seizures  Do you have signs of a concussion?  A concussion is an injury to the brain caused by shaking. If you were knocked out, that s a sign you may have a concussion. But watch for these signs, too:    Upset stomach (nausea)    Throwing up (vomiting)    Feeling dizzy or confused    Headache    Loss of memory  If you have any of the above signs:    Don t  return to sports or any activity where you might hurt your head again.    Wait until all symptoms have been gone for 2 full weeks, and your doctor has cleared you to return to sports.  You could get a serious brain injury if you get hurt again before fully recovering.  General care    You don t need to stay awake or be woken during the night.    For pain, you may use:  ? Tylenol (acetaminophen) 650 to 1000 mg every 6 hours OR  ? Motrin or Advil (ibuprofen) 600 mg every 6 to 8 hours with food  NOTE: If you have chronic liver or kidney disease or ever had a stomach ulcer or GI bleeding, talk with your doctor before using these medicines.    Don t take aspirin after a head injury.    For swelling and to help with pain: Put a cold source to the injured area for up to 20 minutes at a time. Do this as often as directed. Use a cold pack or bag of ice wrapped in a thin towel. Never put a cold source directly on the skin.    For cuts and scrapes: Care for them as the doctor or nurse directed.    For the next 24 hours (or longer, if your doctor advises it):  ? Don t drink alcohol, use sedatives, or use other medicines that make you sleepy.  ? Don t drive or use large machines.  ? Don t do anything tiring, such as heavy lifting or straining.  ? Limit tasks where you need to focus or concentrate. This includes reading, using a smartphone or computer, watching TV and playing video games.  ? Don t return to sports or other activities that could cause another head injury.  Follow-up care  Follow up with your doctor if symptoms don t get better after 24 hours, or as directed.  When to call the doctor  Call your doctor right away if any of these occur:    Pain doesn t get better or gets worse    New or increased swelling or bruising    Fever of 100.4 F (38 C) or higher, or as directed by your doctor    Increased redness, warmth, draining or bleeding from the injury    Fluid drainage or bleeding from the nose or ears    Any pushed-in  spot or bony bump in the injured area  This information has been modified by your health care provider with permission from the publisher.  Modifications clinically reviewed by Kevin Pacheco DO, MBA, FACOEP, Director of Physician Informatics for Emergency Medicine, [facility] on 8/20/18.  For informational purposes only. Not to replace the advice of your health care provider.  Copyright   2018 [facility]. All rights reserved.

## 2022-03-03 ENCOUNTER — OFFICE VISIT (OUTPATIENT)
Dept: FAMILY MEDICINE | Facility: CLINIC | Age: 27
End: 2022-03-03
Payer: COMMERCIAL

## 2022-03-03 ENCOUNTER — HOSPITAL ENCOUNTER (OUTPATIENT)
Dept: CT IMAGING | Facility: CLINIC | Age: 27
Discharge: HOME | End: 2022-03-03
Attending: FAMILY MEDICINE | Admitting: FAMILY MEDICINE
Payer: COMMERCIAL

## 2022-03-03 ENCOUNTER — E-VISIT (OUTPATIENT)
Dept: FAMILY MEDICINE | Facility: CLINIC | Age: 27
End: 2022-03-03

## 2022-03-03 VITALS
HEART RATE: 88 BPM | TEMPERATURE: 97.3 F | DIASTOLIC BLOOD PRESSURE: 90 MMHG | OXYGEN SATURATION: 97 % | SYSTOLIC BLOOD PRESSURE: 150 MMHG | BODY MASS INDEX: 60.04 KG/M2 | WEIGHT: 293 LBS

## 2022-03-03 DIAGNOSIS — E66.01 MORBID OBESITY WITH BMI OF 60.0-69.9, ADULT (H): ICD-10-CM

## 2022-03-03 DIAGNOSIS — Z53.9 ERRONEOUS ENCOUNTER--DISREGARD: Primary | ICD-10-CM

## 2022-03-03 DIAGNOSIS — I10 BENIGN ESSENTIAL HYPERTENSION: ICD-10-CM

## 2022-03-03 DIAGNOSIS — R10.84 GENERALIZED ABDOMINAL PAIN: ICD-10-CM

## 2022-03-03 DIAGNOSIS — R10.84 GENERALIZED ABDOMINAL PAIN: Primary | ICD-10-CM

## 2022-03-03 DIAGNOSIS — F33.40 RECURRENT MAJOR DEPRESSIVE DISORDER, IN REMISSION (H): ICD-10-CM

## 2022-03-03 DIAGNOSIS — Z97.5 IUD (INTRAUTERINE DEVICE) IN PLACE: ICD-10-CM

## 2022-03-03 DIAGNOSIS — F41.1 GAD (GENERALIZED ANXIETY DISORDER): ICD-10-CM

## 2022-03-03 LAB
ALBUMIN UR-MCNC: NEGATIVE MG/DL
APPEARANCE UR: CLEAR
BILIRUB UR QL STRIP: NEGATIVE
COLOR UR AUTO: YELLOW
ERYTHROCYTE [DISTWIDTH] IN BLOOD BY AUTOMATED COUNT: 13.5 % (ref 10–15)
GLUCOSE UR STRIP-MCNC: NEGATIVE MG/DL
HCG UR QL: NEGATIVE
HCT VFR BLD AUTO: 41.9 % (ref 35–47)
HGB BLD-MCNC: 13.8 G/DL (ref 11.7–15.7)
HGB UR QL STRIP: NEGATIVE
KETONES UR STRIP-MCNC: NEGATIVE MG/DL
LEUKOCYTE ESTERASE UR QL STRIP: NEGATIVE
LIPASE SERPL-CCNC: 67 U/L (ref 73–393)
MCH RBC QN AUTO: 29.3 PG (ref 26.5–33)
MCHC RBC AUTO-ENTMCNC: 32.9 G/DL (ref 31.5–36.5)
MCV RBC AUTO: 89 FL (ref 78–100)
NITRATE UR QL: NEGATIVE
PH UR STRIP: 6 [PH] (ref 5–7)
PLATELET # BLD AUTO: 331 10E3/UL (ref 150–450)
RBC # BLD AUTO: 4.71 10E6/UL (ref 3.8–5.2)
RBC #/AREA URNS AUTO: NORMAL /HPF
SP GR UR STRIP: >=1.03 (ref 1–1.03)
UROBILINOGEN UR STRIP-ACNC: 0.2 E.U./DL
WBC # BLD AUTO: 10.1 10E3/UL (ref 4–11)
WBC #/AREA URNS AUTO: NORMAL /HPF

## 2022-03-03 PROCEDURE — 36415 COLL VENOUS BLD VENIPUNCTURE: CPT | Performed by: FAMILY MEDICINE

## 2022-03-03 PROCEDURE — 99214 OFFICE O/P EST MOD 30 MIN: CPT | Performed by: FAMILY MEDICINE

## 2022-03-03 PROCEDURE — 81001 URINALYSIS AUTO W/SCOPE: CPT | Performed by: FAMILY MEDICINE

## 2022-03-03 PROCEDURE — 80053 COMPREHEN METABOLIC PANEL: CPT | Performed by: FAMILY MEDICINE

## 2022-03-03 PROCEDURE — 81025 URINE PREGNANCY TEST: CPT | Performed by: FAMILY MEDICINE

## 2022-03-03 PROCEDURE — 74176 CT ABD & PELVIS W/O CONTRAST: CPT

## 2022-03-03 PROCEDURE — 83690 ASSAY OF LIPASE: CPT | Performed by: FAMILY MEDICINE

## 2022-03-03 PROCEDURE — 85027 COMPLETE CBC AUTOMATED: CPT | Performed by: FAMILY MEDICINE

## 2022-03-03 RX ORDER — LOSARTAN POTASSIUM 100 MG/1
100 TABLET ORAL DAILY
Qty: 90 TABLET | Refills: 0 | Status: SHIPPED | OUTPATIENT
Start: 2022-03-17 | End: 2022-07-21

## 2022-03-03 NOTE — TELEPHONE ENCOUNTER
DD,   Spoke with pt   See below  Pt is able to come in - pain to central lower abdomen  No fever, nausea, vomiting  Can you see pt in your 1240pm virtual today?  No openings with anyone else  Tavia STEVENS RN

## 2022-03-03 NOTE — PROGRESS NOTES
Assessment & Plan     Generalized abdominal pain  The underlying cause of her abdominal pain symptoms is not clear at this time.  She describes having pain primarily in the lower quadrants, but on exam she is most tender in the epigastric region which reproduces symptoms.  I explained the wide range of potential causes and recommended we check labs as listed below.  We also discussed imaging studies that may be helpful and decided to check a CT scan since her symptoms have been so severe and continue to persist.  She may take Tylenol as needed.  I recommended a trial of omeprazole and dietary modifications to see if this helps as there may be an underlying gastric/acid issue causing the problem.  - CBC with platelets; Future  - Comprehensive metabolic panel (BMP + Alb, Alk Phos, ALT, AST, Total. Bili, TP); Future  - Lipase; Future  - HCG Qual, Urine ([id number]); Future  - UA with Microscopic reflex to Culture - lab collect; Future  - CT Abdomen Pelvis w/o Contrast; Future  - CBC with platelets  - Comprehensive metabolic panel (BMP + Alb, Alk Phos, ALT, AST, Total. Bili, TP)  - Lipase  - UA with Microscopic reflex to Culture - lab collect  - Urine Microscopic    Morbid obesity with BMI of 60.0-69.9, adult (H)  She will continue to work on weight loss efforts.    Benign essential hypertension  Her blood pressure was high on exam today.  She also appeared to be quite anxious which may have contributed to this.  I recommended increasing the losartan dose to 100 mg daily and recommended she schedule follow-up with her PCP in the next few weeks.  - losartan (COZAAR) 100 MG tablet; Take 1 tablet (100 mg) by mouth daily    IUD (intrauterine device) in place    Recurrent major depressive disorder, in remission (H)  This issue has been stable on Wellbutrin, Lexapro    BERTHA (generalized anxiety disorder)  She is currently on Lexapro, Wellbutrin and buspirone.  She appears to be very anxious on exam today.                    Return in about 2 weeks (around 3/17/2022) for Follow up if symptoms not improving..    Edmundo Brumfield [facility]    Maximus Mathews is a 26 year old who presents for the following health issues     History of Present Illness     Reason for visit:  Persistent stomach pain  Symptom onset:  1-3 days ago  Symptoms include:  Sharp pain in stomach on and off all week  Symptom intensity:  Moderate  Symptom progression:  Staying the same  Had these symptoms before:  No    She eats 0-1 servings of fruits and vegetables daily.She consumes 1 sweetened beverage(s) daily.She exercises with enough effort to increase her heart rate 9 or less minutes per day.  She exercises with enough effort to increase her heart rate 5 days per week.   She is taking medications regularly.       Pain History:  When did you first notice your pain? - Less than 1 week   Have you seen anyone else for your pain? No  Where in your body do you have pain? Abdominal/Flank Pain  Onset/Duration: 4 days ago   Description:   Character: Sharp intermittent pains that fluctuate with intensity in the lower abdomen  Location: right lower quadrant left lower quadrant  Radiation: None  Intensity: moderate  Progression of Symptoms:  intermittent  Accompanying Signs & Symptoms:  Fever/Chills: no  Gas/Bloating: no  Nausea: YES  Vomitting: no  Diarrhea: YES.  Stools are brown in color.  They are not black or bloody  Constipation: no  Dysuria or Hematuria: no  History:   Trauma: no  Previous similar pain: no  Previous tests done: none  Precipitating factors:   Does the pain change with:     Food: no    Bowel Movement: no    Urination: no   Other factors:  no  Therapies tried and outcome: None  No LMP recorded. (Menstrual status: IUD).    She describes having an acidic feeling in her stomach and is wondering if that may be contributing.  She is also been wondering about gas pains, but she has tried a gas medication which does  not seem to help.      Review of Systems   Constitutional, HEENT, cardiovascular, pulmonary, gi and gu systems are negative, except as otherwise noted.      Objective    BP (!) 150/90   Pulse 88   Temp 97.3  F (36.3  C) (Temporal)   Wt (!) 164.7 kg (363 lb)   SpO2 97%   BMI 60.04 kg/m    Body mass index is 60.04 kg/m .  Physical Exam   GENERAL: healthy, alert and no distress  EYES: Eyes grossly normal to inspection, PERRL and conjunctivae and sclerae normal  NECK: no adenopathy, no asymmetry, masses, or scars and thyroid normal to palpation  RESP: lungs clear to auscultation - no rales, rhonchi or wheezes  CV: regular rate and rhythm, normal S1 S2, no S3 or S4, no murmur, click or rub, no peripheral edema and peripheral pulses strong  ABDOMEN: soft, tender to palpation in the epigastric region without rebounding, guarding or rigidity.  She is nontender at the left and right lower quadrants and at McBurney's point.  There is some mild suprapubic tenderness.  No hepatosplenomegaly, no masses and bowel sounds normal  MS: no gross musculoskeletal defects noted, no edema  SKIN: no suspicious lesions or rashes  NEURO: Normal strength and tone, mentation intact and speech normal  PSYCH: She appears very anxious throughout the exam.  She answers questions appropriately.

## 2022-03-03 NOTE — TELEPHONE ENCOUNTER
Provider E-Visit time total (minutes): NO CHARGE - office visit    Triage -   Please call this patient -   I think if she is having some lower abdominal pain she should be seen to make sure not appendix or other   Should be with first available any provider  Thanks  PN

## 2022-03-04 ENCOUNTER — TELEPHONE (OUTPATIENT)
Dept: FAMILY MEDICINE | Facility: CLINIC | Age: 27
End: 2022-03-04
Payer: COMMERCIAL

## 2022-03-04 LAB
ALBUMIN SERPL-MCNC: 3.5 G/DL (ref 3.4–5)
ALP SERPL-CCNC: 60 U/L (ref 40–150)
ALT SERPL W P-5'-P-CCNC: 41 U/L (ref 0–50)
ANION GAP SERPL CALCULATED.3IONS-SCNC: 7 MMOL/L (ref 3–14)
AST SERPL W P-5'-P-CCNC: 27 U/L (ref 0–45)
BILIRUB SERPL-MCNC: 0.4 MG/DL (ref 0.2–1.3)
BUN SERPL-MCNC: 8 MG/DL (ref 7–30)
CALCIUM SERPL-MCNC: 9.2 MG/DL (ref 8.5–10.1)
CHLORIDE BLD-SCNC: 105 MMOL/L (ref 94–109)
CO2 SERPL-SCNC: 25 MMOL/L (ref 20–32)
CREAT SERPL-MCNC: 0.63 MG/DL (ref 0.52–1.04)
GFR SERPL CREATININE-BSD FRML MDRD: >90 ML/MIN/1.73M2
GLUCOSE BLD-MCNC: 89 MG/DL (ref 70–99)
POTASSIUM BLD-SCNC: 4.1 MMOL/L (ref 3.4–5.3)
PROT SERPL-MCNC: 7.4 G/DL (ref 6.8–8.8)
SODIUM SERPL-SCNC: 137 MMOL/L (ref 133–144)

## 2022-03-04 NOTE — TELEPHONE ENCOUNTER
Patient informed  States pain is improved  Will callback if worsening/not improved  Tavia STEVENS RN

## 2022-03-04 NOTE — TELEPHONE ENCOUNTER
----- Message from Edmundo Brumfield, DO sent at 3/4/2022  2:21 PM CST -----  Please contact this patient and let her know that the imaging results show that there are fatty deposits in the liver, but this should not be a cause for her pain. The rest of the CT scan results are normal.  Thank you, DE

## 2022-05-11 ENCOUNTER — MYC MEDICAL ADVICE (OUTPATIENT)
Dept: FAMILY MEDICINE | Facility: CLINIC | Age: 27
End: 2022-05-11
Payer: COMMERCIAL

## 2022-05-11 DIAGNOSIS — R10.84 GENERALIZED ABDOMINAL PAIN: ICD-10-CM

## 2022-05-11 NOTE — TELEPHONE ENCOUNTER
Patient given as trial medication at 3/3/22 OV.    [facility] message sent to patient asking her if medication working.  Dianna Delcid RN

## 2022-05-11 NOTE — TELEPHONE ENCOUNTER
[facility] message sent by patient:    Hi there-     Sorry for that- I didn t request a refill as I hadn t needed to use it all that much- only once since March. My pharmacy just wanted to auto fill it for me but I m all good on my supply.      Dianna Delcid RN

## 2022-07-03 ENCOUNTER — HEALTH MAINTENANCE LETTER (OUTPATIENT)
Age: 27
End: 2022-07-03

## 2022-07-21 DIAGNOSIS — I10 BENIGN ESSENTIAL HYPERTENSION: ICD-10-CM

## 2022-07-21 RX ORDER — LOSARTAN POTASSIUM 100 MG/1
100 TABLET ORAL DAILY
Qty: 30 TABLET | Refills: 0 | Status: SHIPPED | OUTPATIENT
Start: 2022-07-21 | End: 2022-09-01

## 2022-07-21 NOTE — TELEPHONE ENCOUNTER
Medication is being filled for 1 time refill only due to:  Patient needs to be seen because due for follow up.      Note from 3/3/22 OV:  Benign essential hypertension  Her blood pressure was high on exam today.  She also appeared to be quite anxious which may have contributed to this.  I recommended increasing the losartan dose to 100 mg daily and recommended she schedule follow-up with her PCP in the next few weeks.  - losartan (COZAAR) 100 MG tablet; Take 1 tablet (100 mg) by mouth     Dianna Delcid RN

## 2022-08-31 DIAGNOSIS — I10 BENIGN ESSENTIAL HYPERTENSION: ICD-10-CM

## 2022-09-01 ENCOUNTER — MYC MEDICAL ADVICE (OUTPATIENT)
Dept: FAMILY MEDICINE | Facility: CLINIC | Age: 27
End: 2022-09-01

## 2022-09-01 RX ORDER — LOSARTAN POTASSIUM 100 MG/1
TABLET ORAL
Qty: 30 TABLET | Refills: 0 | Status: SHIPPED | OUTPATIENT
Start: 2022-09-01 | End: 2022-09-23

## 2022-09-01 NOTE — TELEPHONE ENCOUNTER
Patient scheduled appointment for 9/23.  Refill sent for one more month supply.    Dianna Delcid RN

## 2022-09-01 NOTE — TELEPHONE ENCOUNTER
One month supply sent 7/21  Due for follow up    [facility] message sent to patient asking her to schedule appointment.  Dianna Delcid RN

## 2022-09-22 NOTE — PROGRESS NOTES
"Bisi is a 27 year old who is being evaluated via a billable video visit.      How would you like to obtain your AVS? MyChart  If the video visit is dropped, the invitation should be resent by: Text to cell phone: [phone]  Will anyone else be joining your video visit? No        Assessment & Plan     Benign essential hypertension  HTN -   Improved control on higher dose of losartan 100mg daily   Will refill   Advised to monitor BP few times per month  - losartan (COZAAR) 100 MG tablet; Take 1 tablet (100 mg) by mouth daily    Pt had COVID in April 2022  Recommended booster at this time and flu shot         BMI:   Estimated body mass index is 60.04 kg/m  as calculated from the following:    Height as of 1/11/22: 1.656 m (5' 5.2\").    Weight as of 3/3/22: 164.7 kg (363 lb).           No follow-ups on file.    Florecita [facility]    Subjective   Bisi is a 27 year old, presenting for the following health issues:  Hypertension      History of Present Illness       Hypertension: She presents for follow up of hypertension.  She does not check blood pressure  regularly outside of the clinic. Outpatient blood pressures have not been over 140/90. She follows a low salt diet.      Today's PHQ-9         PHQ-9 Total Score: 9    PHQ-9 Q9 Thoughts of better off dead/self-harm past 2 weeks :   Not at all    How difficult have these problems made it for you to do your work, take care of things at home, or get along with other people: Somewhat difficult       Hypertension Follow-up      How many servings of fruits and vegetables do you eat daily?  2-3    On average, how many sweetened beverages do you drink each day (Examples: soda, juice, sweet tea, etc.  Do NOT count diet or artificially sweetened beverages)?   1    How many days per week do you exercise enough to make your heart beat faster? 3 or less    How many minutes a day do you exercise enough to make your heart beat faster? 20 - " 29  How many days per week do you miss taking your medication? 4    What makes it hard for you to take your medications?  remembering to take and moving     Blood pressure - readings were high  Had higher stress and anxiety with a move that happened last week  Wasn't taking medications regularly      Dose of losartan increased from 50mg up to 100mg           Review of Systems   Constitutional, HEENT, cardiovascular, pulmonary, gi and gu systems are negative, except as otherwise noted.      Objective    Vitals - Patient Reported  Systolic (Patient Reported): 115  Diastolic (Patient Reported): 73  Pain Score: No Pain (0)      Vitals:  No vitals were obtained today due to virtual visit.    Physical Exam   GENERAL: Healthy, alert and no distress  EYES: Eyes grossly normal to inspection.  No discharge or erythema, or obvious scleral/conjunctival abnormalities.  RESP: No audible wheeze, cough, or visible cyanosis.  No visible retractions or increased work of breathing.    SKIN: Visible skin clear. No significant rash, abnormal pigmentation or lesions.  NEURO: Cranial nerves grossly intact.  Mentation and speech appropriate for age.  PSYCH: Mentation appears normal, affect normal/bright, judgement and insight intact, normal speech and appearance well-groomed.                Video-Visit Details    Video Start Time: 4:12pm    Type of service:  Video Visit    Video End Time:4:28 PM    Originating Location (pt. Location): Home    Distant Location (provider location):  [facility]     Platform used for Video Visit: [employer]

## 2022-09-23 ENCOUNTER — VIRTUAL VISIT (OUTPATIENT)
Dept: FAMILY MEDICINE | Facility: CLINIC | Age: 27
End: 2022-09-23
Payer: COMMERCIAL

## 2022-09-23 DIAGNOSIS — I10 BENIGN ESSENTIAL HYPERTENSION: ICD-10-CM

## 2022-09-23 PROCEDURE — 99213 OFFICE O/P EST LOW 20 MIN: CPT | Mod: 95 | Performed by: FAMILY MEDICINE

## 2022-09-23 RX ORDER — ESCITALOPRAM OXALATE 5 MG/1
5 TABLET ORAL DAILY
COMMUNITY

## 2022-09-23 RX ORDER — LOSARTAN POTASSIUM 100 MG/1
100 TABLET ORAL DAILY
Qty: 90 TABLET | Refills: 1 | Status: SHIPPED | OUTPATIENT
Start: 2022-09-23 | End: 2023-10-02

## 2022-09-23 RX ORDER — ESCITALOPRAM OXALATE 10 MG/1
10 TABLET ORAL DAILY
COMMUNITY
Start: 2022-09-23

## 2022-09-23 ASSESSMENT — PATIENT HEALTH QUESTIONNAIRE - PHQ9
10. IF YOU CHECKED OFF ANY PROBLEMS, HOW DIFFICULT HAVE THESE PROBLEMS MADE IT FOR YOU TO DO YOUR WORK, TAKE CARE OF THINGS AT HOME, OR GET ALONG WITH OTHER PEOPLE: SOMEWHAT DIFFICULT
SUM OF ALL RESPONSES TO PHQ QUESTIONS 1-9: 9
SUM OF ALL RESPONSES TO PHQ QUESTIONS 1-9: 9

## 2022-10-31 ENCOUNTER — IMMUNIZATION (OUTPATIENT)
Dept: FAMILY MEDICINE | Facility: CLINIC | Age: 27
End: 2022-10-31
Payer: COMMERCIAL

## 2022-10-31 DIAGNOSIS — Z23 NEED FOR VACCINATION: Primary | ICD-10-CM

## 2022-10-31 PROCEDURE — 90471 IMMUNIZATION ADMIN: CPT

## 2022-10-31 PROCEDURE — 99207 PR NO CHARGE NURSE ONLY: CPT

## 2022-10-31 PROCEDURE — 0124A COVID-19,PF,PFIZER BOOSTER BIVALENT: CPT

## 2022-10-31 PROCEDURE — 91312 COVID-19,PF,PFIZER BOOSTER BIVALENT: CPT

## 2022-10-31 PROCEDURE — 90686 IIV4 VACC NO PRSV 0.5 ML IM: CPT

## 2023-07-22 ENCOUNTER — HEALTH MAINTENANCE LETTER (OUTPATIENT)
Age: 28
End: 2023-07-22

## 2023-09-24 DIAGNOSIS — I10 BENIGN ESSENTIAL HYPERTENSION: ICD-10-CM

## 2023-09-25 NOTE — TELEPHONE ENCOUNTER
Last prescription sent 9/2022.  Left non detailed VM for pt asking that they callback- due for physical.  Has she been taking medication?  Wanda BEAULIEU RN

## 2023-09-29 ENCOUNTER — MYC MEDICAL ADVICE (OUTPATIENT)
Dept: FAMILY MEDICINE | Facility: CLINIC | Age: 28
End: 2023-09-29

## 2023-10-02 DIAGNOSIS — I10 BENIGN ESSENTIAL HYPERTENSION: ICD-10-CM

## 2023-10-02 RX ORDER — LOSARTAN POTASSIUM 100 MG/1
100 TABLET ORAL DAILY
Qty: 30 TABLET | Refills: 0 | Status: SHIPPED | OUTPATIENT
Start: 2023-10-02 | End: 2023-10-10

## 2023-10-02 NOTE — TELEPHONE ENCOUNTER
Prescription approved per [facility] Refill Protocol.  Scheduled.  Wanda BEAULIEU RN

## 2023-10-03 RX ORDER — LOSARTAN POTASSIUM 100 MG/1
100 TABLET ORAL DAILY
Qty: 1 TABLET | Refills: 0 | OUTPATIENT
Start: 2023-10-03

## 2023-10-03 NOTE — TELEPHONE ENCOUNTER
Duplicate.  Refill sent yesterday for #30.  Due for physical .  Last seen over one year ago    Dianna Delcid RN

## 2023-10-10 ENCOUNTER — OFFICE VISIT (OUTPATIENT)
Dept: FAMILY MEDICINE | Facility: CLINIC | Age: 28
End: 2023-10-10
Payer: COMMERCIAL

## 2023-10-10 VITALS
SYSTOLIC BLOOD PRESSURE: 143 MMHG | WEIGHT: 293 LBS | TEMPERATURE: 97.3 F | HEART RATE: 116 BPM | RESPIRATION RATE: 18 BRPM | DIASTOLIC BLOOD PRESSURE: 101 MMHG | OXYGEN SATURATION: 95 % | BODY MASS INDEX: 48.82 KG/M2 | HEIGHT: 65 IN

## 2023-10-10 DIAGNOSIS — F41.1 GAD (GENERALIZED ANXIETY DISORDER): ICD-10-CM

## 2023-10-10 DIAGNOSIS — Z00.00 ROUTINE GENERAL MEDICAL EXAMINATION AT A HEALTH CARE FACILITY: Primary | ICD-10-CM

## 2023-10-10 DIAGNOSIS — I10 BENIGN ESSENTIAL HYPERTENSION: ICD-10-CM

## 2023-10-10 LAB
ALBUMIN SERPL BCG-MCNC: 4.6 G/DL (ref 3.5–5.2)
ALP SERPL-CCNC: 60 U/L (ref 35–104)
ALT SERPL W P-5'-P-CCNC: 27 U/L (ref 0–50)
ANION GAP SERPL CALCULATED.3IONS-SCNC: 14 MMOL/L (ref 7–15)
AST SERPL W P-5'-P-CCNC: 26 U/L (ref 0–45)
BILIRUB SERPL-MCNC: 0.4 MG/DL
BUN SERPL-MCNC: 7.6 MG/DL (ref 6–20)
CALCIUM SERPL-MCNC: 10 MG/DL (ref 8.6–10)
CHLORIDE SERPL-SCNC: 100 MMOL/L (ref 98–107)
CHOLEST SERPL-MCNC: 188 MG/DL
CREAT SERPL-MCNC: 0.73 MG/DL (ref 0.51–0.95)
DEPRECATED HCO3 PLAS-SCNC: 23 MMOL/L (ref 22–29)
EGFRCR SERPLBLD CKD-EPI 2021: >90 ML/MIN/1.73M2
GLUCOSE SERPL-MCNC: 94 MG/DL (ref 70–99)
HDLC SERPL-MCNC: 51 MG/DL
LDLC SERPL CALC-MCNC: 112 MG/DL
NONHDLC SERPL-MCNC: 137 MG/DL
POTASSIUM SERPL-SCNC: 4 MMOL/L (ref 3.4–5.3)
PROT SERPL-MCNC: 7.8 G/DL (ref 6.4–8.3)
SODIUM SERPL-SCNC: 137 MMOL/L (ref 135–145)
TRIGL SERPL-MCNC: 124 MG/DL
TSH SERPL DL<=0.005 MIU/L-ACNC: 1.54 UIU/ML (ref 0.3–4.2)

## 2023-10-10 PROCEDURE — 84443 ASSAY THYROID STIM HORMONE: CPT | Performed by: FAMILY MEDICINE

## 2023-10-10 PROCEDURE — 99395 PREV VISIT EST AGE 18-39: CPT | Mod: 25 | Performed by: FAMILY MEDICINE

## 2023-10-10 PROCEDURE — 90686 IIV4 VACC NO PRSV 0.5 ML IM: CPT | Performed by: FAMILY MEDICINE

## 2023-10-10 PROCEDURE — 36415 COLL VENOUS BLD VENIPUNCTURE: CPT | Performed by: FAMILY MEDICINE

## 2023-10-10 PROCEDURE — 91320 SARSCV2 VAC 30MCG TRS-SUC IM: CPT | Performed by: FAMILY MEDICINE

## 2023-10-10 PROCEDURE — 90471 IMMUNIZATION ADMIN: CPT | Performed by: FAMILY MEDICINE

## 2023-10-10 PROCEDURE — 80053 COMPREHEN METABOLIC PANEL: CPT | Performed by: FAMILY MEDICINE

## 2023-10-10 PROCEDURE — 99214 OFFICE O/P EST MOD 30 MIN: CPT | Mod: 25 | Performed by: FAMILY MEDICINE

## 2023-10-10 PROCEDURE — 90715 TDAP VACCINE 7 YRS/> IM: CPT | Performed by: FAMILY MEDICINE

## 2023-10-10 PROCEDURE — 96127 BRIEF EMOTIONAL/BEHAV ASSMT: CPT | Performed by: FAMILY MEDICINE

## 2023-10-10 PROCEDURE — 80061 LIPID PANEL: CPT | Performed by: FAMILY MEDICINE

## 2023-10-10 PROCEDURE — 90480 ADMN SARSCOV2 VAC 1/ONLY CMP: CPT | Performed by: FAMILY MEDICINE

## 2023-10-10 PROCEDURE — 90472 IMMUNIZATION ADMIN EACH ADD: CPT | Performed by: FAMILY MEDICINE

## 2023-10-10 RX ORDER — HYDROXYZINE HYDROCHLORIDE 10 MG/1
10 TABLET, FILM COATED ORAL PRN
COMMUNITY
Start: 2023-10-04

## 2023-10-10 RX ORDER — LOSARTAN POTASSIUM 100 MG/1
100 TABLET ORAL DAILY
Qty: 90 TABLET | Refills: 3 | Status: SHIPPED | OUTPATIENT
Start: 2023-10-10

## 2023-10-10 ASSESSMENT — ANXIETY QUESTIONNAIRES
4. TROUBLE RELAXING: NEARLY EVERY DAY
7. FEELING AFRAID AS IF SOMETHING AWFUL MIGHT HAPPEN: NEARLY EVERY DAY
3. WORRYING TOO MUCH ABOUT DIFFERENT THINGS: NEARLY EVERY DAY
GAD7 TOTAL SCORE: 21
IF YOU CHECKED OFF ANY PROBLEMS ON THIS QUESTIONNAIRE, HOW DIFFICULT HAVE THESE PROBLEMS MADE IT FOR YOU TO DO YOUR WORK, TAKE CARE OF THINGS AT HOME, OR GET ALONG WITH OTHER PEOPLE: VERY DIFFICULT
6. BECOMING EASILY ANNOYED OR IRRITABLE: NEARLY EVERY DAY
GAD7 TOTAL SCORE: 21
2. NOT BEING ABLE TO STOP OR CONTROL WORRYING: NEARLY EVERY DAY
1. FEELING NERVOUS, ANXIOUS, OR ON EDGE: NEARLY EVERY DAY
5. BEING SO RESTLESS THAT IT IS HARD TO SIT STILL: NEARLY EVERY DAY

## 2023-10-10 ASSESSMENT — ENCOUNTER SYMPTOMS
FREQUENCY: 0
SHORTNESS OF BREATH: 0
WEAKNESS: 0
DIZZINESS: 0
DYSURIA: 0
DIARRHEA: 0
NERVOUS/ANXIOUS: 1
ARTHRALGIAS: 0
COUGH: 0
NAUSEA: 1
CONSTIPATION: 0
FEVER: 0
HEADACHES: 0
CHILLS: 0
PALPITATIONS: 0
HEMATOCHEZIA: 0
ABDOMINAL PAIN: 0
JOINT SWELLING: 0
PARESTHESIAS: 0
HEMATURIA: 0
MYALGIAS: 0
HEARTBURN: 0
SORE THROAT: 0
EYE PAIN: 0

## 2023-10-10 ASSESSMENT — PAIN SCALES - GENERAL: PAINLEVEL: NO PAIN (0)

## 2023-10-10 ASSESSMENT — PATIENT HEALTH QUESTIONNAIRE - PHQ9
10. IF YOU CHECKED OFF ANY PROBLEMS, HOW DIFFICULT HAVE THESE PROBLEMS MADE IT FOR YOU TO DO YOUR WORK, TAKE CARE OF THINGS AT HOME, OR GET ALONG WITH OTHER PEOPLE: SOMEWHAT DIFFICULT
SUM OF ALL RESPONSES TO PHQ QUESTIONS 1-9: 18
SUM OF ALL RESPONSES TO PHQ QUESTIONS 1-9: 18

## 2023-10-10 NOTE — COMMUNITY RESOURCES LIST (ENGLISH)
10/10/2023   [facility] - Outpatient Clinics  N/A  For additional resource needs, please contact your health insurance member services or your primary care team.  Phone: [phone]   Email: N/A   Address: [facility] [place] [address]   Hours: N/A        Food and Nutrition       Food pantry  1  [facility] - Food Shelf Distance: 2.9 miles      Pickup   [address] [place] [address]  Language: Uzbek, English, Belarusian, Congolese, Tamazight  Hours: Sun 11:00 AM - 1:30 PM  Fees: Free   Phone: (268) 538-5131 Email: [facility].org Website: http://www.[facility].org     2  [facility] Distance: 3.16 miles      [address] [place] [address]  Language: English, Citizen of [place]  Hours: Wed 6:30 PM - 7:00 PM  Fees: Free   Phone: (969) 616-7648 Email: ohbchurc@[employer] Website: http://[facility].org/     SNAP application assistance  3  [employer] [place] Distance: 7.42 miles      Phone/[address] [address]  Language: English, Hmong, Icelandic, Congolese, Citizen of [place]  Hours: Mon - Fri 8:30 AM - 4:30 PM  Fees: Free   Phone: (135) 640-9877 Email: helpline@[employer].org Website: https://www.[employer].org/programs/[place]-food-helpline/     4  [facility] Extension - SNAP Ed - SNAP Ed Distance: 4.14 miles      In-Person   [address] [place] [address]  Language: English, Hmong, Oromo, Congolese, Citizen of [place]  Hours: Mon - Fri 9:00 AM - 5:00 PM Appt. Only  Fees: Free   Phone: (882) 616-7580 Email: raine@[facility] Website: https://extension.[facility]/nutrition-education/snap-ed-educational-offerings     Soup kitchen or free meals  5  [facility] - Community Meal Distance: 2.39 miles      Pickup   [address] [place] [address]  Language: English  Hours: Mon 5:00 PM - 5:45 PM  Fees: Free   Phone: (356) 361-9753 Email: office@[facility] Website:  http://www.[facility].org     6  [facility] - [facility] Dinner Distance: 2.85 miles      Pickup   [address] [place] [address]  Language: English  Hours: Tue 5:00 PM - 6:30 PM  Fees: Free   Phone: (777) 825-6602 Email: admin@[employer] Website: http://www.[employer]/          Important Numbers & Websites       [address].org  Poison Control   (825) 194-2917 [place]  Suicide and Crisis Lifeline   [address] [place], [place] [address]line.org  Childhelp [place] Child Abuse Hotline   578.826.9516 Childhelphotline.org  National Sexual Assault Hotline   (885) 784-2861 (HOPE) [facility] Runaway Safeline   (713) 278-1298 (RUNAWAY) [facility].org  Pregnancy & Postpartum Support [place]   Call/text 254-627-3690 Ppsupportmn.org  Substance Abuse National Helpline ([facility]   579-378-HELP (3296) Findtreatment.gov  Emergency Services   911

## 2023-10-10 NOTE — NURSING NOTE
Per orders of Dr. Silva, injection of COVID, Flu, TDAP given by Rolanda Watters RN. Prior to immunization administration, verified patients identity using patient s name and date of birth. Patient instructed to remain in clinic for 15 minutes afterwards, and to report any adverse reaction to me or clinic staff immediately.    Rolanda Watters RN on 10/10/2023 at 2:47 PM.    Please see Immunization Activity for additional information.

## 2023-10-10 NOTE — COMMUNITY RESOURCES LIST (ENGLISH)
10/10/2023   [facility]  N/A  For questions about this resource list or additional care needs, please contact your primary care clinic or care manager.  Phone: [phone]   Email: N/A   Address: [address] [place] [address]   Hours: N/A        Food and Nutrition       Food pantry  1  [facility] - Food Shelf Distance: 2.9 miles      Pickup   [address] [place] [address]  Language: Syriac, English, Irish, Montenegrin, Occitan  Hours: Sun 11:00 AM - 1:30 PM  Fees: Free   Phone: (770) 674-9662 Email: [facility].org Website: http://www.[facility].org     2  [facility] Distance: 3.16 miles      [address] [place] [address]  Language: English, Djiboutian  Hours: Wed 6:30 PM - 7:00 PM  Fees: Free   Phone: (234) 591-9213 Email: ohbchurc@[employer] Website: http://[facility].org/     SNAP application assistance  3  [facility] Extension - SNAP Ed - SNAP Ed Distance: 4.14 miles      In-Person   [address] [place] [address]  Language: English, Hmong, Oromo, Montenegrin, Djiboutian  Hours: Mon - Fri 9:00 AM - 5:00 PM Appt. Only  Fees: Free   Phone: (992) 918-5924 Email: petersont@[facility] Website: https://extension.[facility]/nutrition-education/snap-ed-educational-offerings     4  [facility] [address] Distance: 5.64 miles      Phone/Virtual   [address] [place] [address]  Language: English, Montenegrin  Hours: Mon - Fri 9:00 AM - 8:00 PM  Fees: Free   Phone: (889) 879-6291 Website: https://www.[facility].org/jerome-chau     Soup kitchen or free meals  5  [facility] - Community Meal Distance: 2.39 miles      Pickup   [address] [place] [address]  Language: English  Hours: Mon 5:00 PM - 5:45 PM  Fees: Free   Phone: (991) 224-4219 Email: office@[facility] / [facility].org Website: http://www.[facility] / [facility].org     6  [facility] - [facility] Dinner Distance: 2.85 miles      Pick   [address] [place] [address]  Language: English  Hours: Tue 5:00 PM - 6:30 PM  Fees: Free   Phone: (345) 921-8586 Email: admin@[employer] Website: http://www.[employer]/          Important Numbers & Websites       Emergency Services   911  [facility] Services   311  Poison Control   (450) 994-7305  Suicide Prevention Lifeline   (977) 798-2356 (TALK)  Child Abuse Hotline   (882) 587-6360 (4-A-Child)  Sexual Assault Hotline   (781) 537-7480 (HOPE)  National Runaway Safeline   (630) 177-2874 (RUNAWAY)  All-Options Talkline   (516) 161-9570  Substance Abuse Referral   (470) 360-5110 (HELP)

## 2023-10-10 NOTE — PROGRESS NOTES
SUBJECTIVE:   CC: Bisi is an 28 year old who presents for preventive health visit.       10/10/2023     2:07 PM   Additional Questions   Roomed by Millicent       Healthy Habits:     Getting at least 3 servings of Calcium per day:  Yes    Bi-annual eye exam:  Yes    Dental care twice a year:  Yes    Sleep apnea or symptoms of sleep apnea:  Daytime drowsiness    Diet:  Low salt    Frequency of exercise:  None    Taking medications regularly:  No    Barriers to taking medications:  Cost of medication and Problems remembering to take them    Medication side effects:  Lightheadedness    Additional concerns today:  No      Today's PHQ-9 Score:       10/10/2023     1:50 PM   PHQ-9 SCORE   PHQ-9 Total Score MyChart 18 (Moderately severe depression)   PHQ-9 Total Score 18                 PROBLEMS TO ADD ON...  -------------------------------------   Worsening anxiety and depression - working with new psychiatrist   Feelings are passive - no active SI  Discussed [facility] unit or [facility]      Social History     Tobacco Use    Smoking status: Never    Smokeless tobacco: Never   Substance Use Topics    Alcohol use: Yes     Comment: less than once a week              10/10/2023     1:52 PM   Alcohol Use   Prescreen: >3 drinks/day or >7 drinks/week? No       Reviewed orders with patient.  Reviewed health maintenance and updated orders accordingly - Yes  Patient Active Problem List   Diagnosis    Rosacea    KP (keratosis pilaris)    CARDIOVASCULAR SCREENING; LDL GOAL LESS THAN 130    BERTHA (generalized anxiety disorder)    Recurrent major depressive disorder, in remission (H24)    PTSD (post-traumatic stress disorder)    BMI 57    IUD (intrauterine device) in place    Benign essential hypertension     Past Surgical History:   Procedure Laterality Date    NO HISTORY OF SURGERY         Social History     Tobacco Use    Smoking status: Never    Smokeless tobacco: Never   Substance Use Topics    Alcohol use: Yes     Comment: less  than once a week      Family History   Problem Relation Age of Onset    Hypertension Mother     Obesity Mother     Cardiovascular Father     Depression Father     Obesity Father     Depression Brother     Depression Sister     Breast Cancer No family hx of     Cancer - colorectal No family hx of     Prostate Cancer No family hx of     Diabetes No family hx of     Coronary Artery Disease No family hx of     Hyperlipidemia No family hx of     Cerebrovascular Disease No family hx of     Colon Cancer No family hx of     Thyroid Disease No family hx of     Genetic Disorder No family hx of            Breast Cancer Screenin/18/2021     7:37 AM   Breast CA Risk Assessment (FHS-7)   Do you have a family history of breast, colon, or ovarian cancer? No / Unknown         Patient under 40 years of age: Routine Mammogram Screening not recommended.   Pertinent mammograms are reviewed under the imaging tab.    History of abnormal Pap smear: NO - age 21-29 PAP every 3 years recommended      2021     8:08 AM 8/3/2017     4:59 PM   PAP / HPV   PAP (Historical) NIL  NIL      Reviewed and updated as needed this visit by clinical staff   Tobacco  Allergies  Meds  Problems  Med Hx  Surg Hx  Fam Hx          Reviewed and updated as needed this visit by Provider   Tobacco  Allergies  Meds  Problems  Med Hx  Surg Hx  Fam Hx             Review of Systems   Constitutional:  Negative for chills and fever.   HENT:  Negative for congestion, ear pain, hearing loss and sore throat.    Eyes:  Negative for pain and visual disturbance.   Respiratory:  Negative for cough and shortness of breath.    Cardiovascular:  Negative for chest pain, palpitations and peripheral edema.   Gastrointestinal:  Positive for nausea. Negative for abdominal pain, constipation, diarrhea, heartburn and hematochezia.   Genitourinary:  Negative for dysuria, frequency, genital sores, hematuria and urgency.   Musculoskeletal:  Negative for  "arthralgias, joint swelling and myalgias.   Skin:  Negative for rash.   Neurological:  Negative for dizziness, weakness, headaches and paresthesias.   Psychiatric/Behavioral:  Positive for mood changes. The patient is nervous/anxious.           OBJECTIVE:   BP (!) 143/101   Pulse 116   Temp 97.3  F (36.3  C) (Temporal)   Resp 18   Ht 1.646 m (5' 4.8\")   Wt (!) 169.9 kg (374 lb 8 oz)   LMP  (LMP Unknown)   SpO2 95%   Breastfeeding No   BMI 62.70 kg/m    Physical Exam  GENERAL: alert and no distress  EYES: Eyes grossly normal to inspection, PERRL and conjunctivae and sclerae normal  HENT: ear canals and TM's normal, nose and mouth without ulcers or lesions  NECK: no adenopathy, no asymmetry, masses, or scars and thyroid normal to palpation  RESP: lungs clear to auscultation - no rales, rhonchi or wheezes  BREAST: normal without masses, tenderness or nipple discharge and no palpable axillary masses or adenopathy  CV: regular rate and rhythm, normal S1 S2, no S3 or S4, no murmur, click or rub, no peripheral edema and peripheral pulses strong  ABDOMEN: soft, nontender, no hepatosplenomegaly, no masses and bowel sounds normal  MS: no gross musculoskeletal defects noted, no edema  SKIN: no suspicious lesions or rashes  NEURO: Normal strength and tone, mentation intact and speech normal  PSYCH: mentation appears normal, affect normal/bright    Diagnostic Test Results:  Labs reviewed in Epic    ASSESSMENT/PLAN:   (Z00.00) Routine general medical examination at a health care facility  (primary encounter diagnosis)  Comment:    Plan: Lipid panel reflex to direct LDL Non-fasting,         TSH with free T4 reflex             (I10) Benign essential hypertension  Comment: BP elevated today - recheck still elevated  Will have her monitor at home   Work on diet and exercise  Did refill losartan -  RTC if stays elevated   Plan: losartan (COZAAR) 100 MG tablet, Comprehensive         metabolic panel (BMP + Alb, Alk Phos, ALT, " AST,        Total. Bili, TP), TSH with free T4 reflex             (F41.1) BERTHA (generalized anxiety disorder)  Comment:  working with psychiatry   Plan: TSH with free T4 reflex                 Depression Screening Follow Up        10/10/2023     1:50 PM   PHQ   PHQ-9 Total Score 18   Q9: Thoughts of better off dead/self-harm past 2 weeks More than half the days   F/U: Thoughts of suicide or self-harm No   F/U: Safety concerns No                 Follow Up    Follow Up Actions Taken      Discussed the following ways the patient can remain in a safe environment:      COUNSELING:  Reviewed preventive health counseling, as reflected in patient instructions        She reports that she has never smoked. She has never used smokeless tobacco.          Florecita Silva,   [facility]

## 2023-10-18 NOTE — RESULT ENCOUNTER NOTE
Deanicole Mathews,   Your test results are all back -   -Cholesterol levels (LDL,HDL, Triglycerides) are normal.  ADVISE: rechecking in 1 year.   -Liver and gallbladder tests (ALT,AST, Alk phos,bilirubin) are normal.  -Kidney function (GFR) is normal.  -Sodium is normal.  -Potassium is normal.  -Calcium is normal.  -Glucose (diabetic screening test) is normal.  -TSH (thyroid stimulating hormone) level is normal which indicates normal thyroid function.  Let us know if you have any questions.  -Florecita Silva, DO

## 2024-09-10 ENCOUNTER — PATIENT OUTREACH (OUTPATIENT)
Dept: CARE COORDINATION | Facility: CLINIC | Age: 29
End: 2024-09-10
Payer: COMMERCIAL

## 2024-09-24 ENCOUNTER — PATIENT OUTREACH (OUTPATIENT)
Dept: CARE COORDINATION | Facility: CLINIC | Age: 29
End: 2024-09-24
Payer: COMMERCIAL

## 2024-11-17 ENCOUNTER — HEALTH MAINTENANCE LETTER (OUTPATIENT)
Age: 29
End: 2024-11-17

## 2025-02-07 ENCOUNTER — OFFICE VISIT (OUTPATIENT)
Dept: OBGYN | Facility: CLINIC | Age: 30
End: 2025-02-07
Attending: OBSTETRICS & GYNECOLOGY
Payer: COMMERCIAL

## 2025-02-07 ENCOUNTER — ANCILLARY PROCEDURE (OUTPATIENT)
Dept: ULTRASOUND IMAGING | Facility: CLINIC | Age: 30
End: 2025-02-07
Payer: COMMERCIAL

## 2025-02-07 VITALS
SYSTOLIC BLOOD PRESSURE: 144 MMHG | DIASTOLIC BLOOD PRESSURE: 103 MMHG | HEART RATE: 105 BPM | BODY MASS INDEX: 48.82 KG/M2 | WEIGHT: 293 LBS | HEIGHT: 65 IN

## 2025-02-07 DIAGNOSIS — Z30.430 ENCOUNTER FOR INSERTION OF INTRAUTERINE CONTRACEPTIVE DEVICE: ICD-10-CM

## 2025-02-07 DIAGNOSIS — Z30.09 GENERAL COUNSELING AND ADVICE ON CONTRACEPTIVE MANAGEMENT: Primary | ICD-10-CM

## 2025-02-07 DIAGNOSIS — T83.9XXA COMPLICATION OF INTRAUTERINE DEVICE (IUD), UNSPECIFIED COMPLICATION, INITIAL ENCOUNTER: ICD-10-CM

## 2025-02-07 DIAGNOSIS — Z30.017 NEXPLANON INSERTION: ICD-10-CM

## 2025-02-07 LAB
HCG UR QL: NEGATIVE
INTERNAL QC OK POCT: NORMAL
POCT KIT EXPIRATION DATE: NORMAL
POCT KIT LOT NUMBER: NORMAL

## 2025-02-07 PROCEDURE — 11981 INSERTION DRUG DLVR IMPLANT: CPT | Mod: GC | Performed by: STUDENT IN AN ORGANIZED HEALTH CARE EDUCATION/TRAINING PROGRAM

## 2025-02-07 PROCEDURE — 250N000011 HC RX IP 250 OP 636: Performed by: STUDENT IN AN ORGANIZED HEALTH CARE EDUCATION/TRAINING PROGRAM

## 2025-02-07 PROCEDURE — 81025 URINE PREGNANCY TEST: CPT

## 2025-02-07 PROCEDURE — 58300 INSERT INTRAUTERINE DEVICE: CPT

## 2025-02-07 PROCEDURE — 76998 US GUIDE INTRAOP: CPT

## 2025-02-07 PROCEDURE — 76998 US GUIDE INTRAOP: CPT | Mod: 26 | Performed by: STUDENT IN AN ORGANIZED HEALTH CARE EDUCATION/TRAINING PROGRAM

## 2025-02-07 PROCEDURE — 11981 INSERTION DRUG DLVR IMPLANT: CPT

## 2025-02-07 PROCEDURE — 58300 INSERT INTRAUTERINE DEVICE: CPT | Mod: 52 | Performed by: STUDENT IN AN ORGANIZED HEALTH CARE EDUCATION/TRAINING PROGRAM

## 2025-02-07 RX ORDER — IBUPROFEN 200 MG
600 TABLET ORAL ONCE
Status: CANCELLED | OUTPATIENT
Start: 2025-02-07 | End: 2025-02-07

## 2025-02-07 RX ADMIN — ETONOGESTREL 68 MG: 68 IMPLANT SUBCUTANEOUS at 15:07

## 2025-02-07 NOTE — PATIENT INSTRUCTIONS
Thank you for trusting us with your care!   Please be aware, if you are on Mychart, you may see your results prior to your providers review. If labs are abnormal, we will call or message you on Rossolinit with a follow up plan.    If you need to contact us for questions about:  Symptoms, Scheduling & Medical Questions; Non-urgent (2-3 day response) [employer] message, Urgent (needing response today) [phone] (if after 3:30pm next day response)   Prescriptions: Please call your Pharmacy   Billing: Pravin [phone] or [facility]:[phone]

## 2025-02-07 NOTE — PROGRESS NOTES
"IUD Insertion:  CONSULT:    Is a pregnancy test required: Yes.  Was it positive or negative?  Negative  Was a consent obtained?  Yes    Subjective: Bisi Prasad is a 29 year old presents for IUD and desires Mirena type IUD.    Patient has been given the opportunity to ask questions about all forms of birth control, including all options appropriate for Bisi Prasad. Discussed that no method of birth control is 100% effective against pregnancy or sexually transmitted infection.     Bisi Prasad understands she may have the IUD removed at any time. IUD should be removed by a health care provider.    The entire insertion procedure was reviewed with the patient, including care after placement.    Patient's last menstrual period was 02/06/2025 (exact date). No allergy to betadine or shellfish.   HCG Qual Urine   Date Value Ref Range Status   02/07/2025 Negative Negative Final         BP (!) 144/103   Pulse 105   Ht 1.646 m (5' 4.8\")   Wt (!) 174.1 kg (383 lb 12.8 oz)   LMP 02/06/2025 (Exact Date)   BMI 64.26 kg/m      Pelvic Exam:   EG/BUS: normal genital architecture without lesions, erythema or abnormal secretions.   Vagina: moist, pink, rugae with physiologic discharge and secretions  Cervix: nulliparous no lesions and pink, moist, closed, without lesion or CMT    PROCEDURE NOTE: -- IUD Insertion    Reason for Insertion: contraception    Premedicated with ibuprofen.  Under sterile technique, cervix was visualized with speculum and prepped with Betadine solution swab x 3. Tenaculum was placed for stability. The uterus was gently straightened and sounding was attempted, but sound was unable to be placed beyond the internal os under ultrasound guidance. A paracervical block was then performed with 20 mL of 1% lidocaine. Additional attempts with an os finder and Russo dilators were attempted to dilate the cervix with minimal success. Sound was able to be briefly passed, so IUD insertion was attempted. " Unfortunately, Mirena IUD unable to be inserted in applicator beyond the internal os. After multiple attempts, decision was made to abort the procedure. Tenaculum was removed and hemostasis noted. Speculum removed.  Patient tolerated procedure well.    EBL: minimal    Complications: none    Further discussion was then had with the patient regarding next steps for contraception. Discussed alternative forms of contraception as well as IUD placement in the OR under sedation. After discussion of risks and benefits, patient desires Nexplanon insertion. Able to add patient on to Dr. Duvall's schedule for Nexplanon placement today. Please see remaining note for details. Dr. Briscoe present for the duration of attempted IUD placement.    Aylin Peñaloza MD  OB/GYN Resident, PGY-4    Preoperative Diagnosis: Desires long-acting reversible contraception    Postoperative Diagnosis: Desired long-acting reversible contraception, s/p Nexplanon placement     Consent: Risks, benefits of treatment, and treatment were discussed. Patient's questions were elicited and answered. Written consent signed and scanned into medical record. Patient received and verbalized understanding of discharge instructions    Skin Preparation: Betadine     Anesthesia: 1 mL 1% lidocaine without EPI; 2 mL 1% lidocaine with Epi    Technique: Patient was consented for Nexplanon placement. The patient's left arm was flexed and externally rotated with her wrist parallel to her ear. The groove between her biceps and triceps was palpated. An insertion osei was made 8 cm from the medial epicondyle of the elbow around 2 cm beneath the groove. A guiding oesi was also made to assist with positioning Implanon correctly at time of insertion. The 2 cc of 1% lidocaine with epinephrine were then injected along the line of insertion of the Nexplanon from the insertion osei to the guiding osei to provide local anesthesia. The patient had inadequate anesthesia with that, and  so 1 mL of 1% lidocaine without epinephrine was used at the insertion site. The tip of the Nexplanon applicator was then inserted at a 45  degree angle into the nick at the insertion osei and the needle was advanced the full length keeping the skin tented during insertion. The applicator seal was broken and the cannula was then retracted. The patient's arm was examined and the Nexplanon elisa was easily palpated. The patient also palpated her arm and was able to appreciate the presence of the elisa. Pressure was held at the insertion osei. A steri-strip was placed over the incision. A band-aid was placed over the steri-strip. A pressure bandage was then placed using Kerlix gauze. The patient tolerated the procedure very well.   EBL: <5 mL   Complications: None   Nexplanon Lot#: [id number]; Exp: 2/2027  Procedure supervised by Dr. Luis Felipe Duvall MD PGY-1  02/07/25 2:55 PM   OBGYN Attending Attestation    Bisi Prasad was seen by the resident, Anna Peñaloza and Jadiel in Continuity of Care Clinic. I, Evan Briscoe MD, reviewed the history and directly supervised the examination and procedure. The assessment and plan were made jointly between myself and Anna Berrios.    Evan Briscoe MD    [facility]  Obstetrics, Gynecology, and Women's Health  12:25 PM 02/08/2025

## 2025-02-07 NOTE — LETTER
"2/7/2025       RE: Bisi Prasad  [address]  [facility] [address]     Dear Colleague,    Thank you for referring your patient, Bisi Prasad, to the [facility] [place] [facility]. Please see a copy of my visit note below.    IUD Insertion:  CONSULT:    Is a pregnancy test required: Yes.  Was it positive or negative?  Negative  Was a consent obtained?  Yes    Subjective: Bisi Prasad is a 29 year old presents for IUD and desires Mirena type IUD.    Patient has been given the opportunity to ask questions about all forms of birth control, including all options appropriate for Bisi Prasad. Discussed that no method of birth control is 100% effective against pregnancy or sexually transmitted infection.     Bisi Prasad understands she may have the IUD removed at any time. IUD should be removed by a health care provider.    The entire insertion procedure was reviewed with the patient, including care after placement.    Patient's last menstrual period was 02/06/2025 (exact date). No allergy to betadine or shellfish.   HCG Qual Urine   Date Value Ref Range Status   02/07/2025 Negative Negative Final         BP (!) 144/103   Pulse 105   Ht 1.646 m (5' 4.8\")   Wt (!) 174.1 kg (383 lb 12.8 oz)   LMP 02/06/2025 (Exact Date)   BMI 64.26 kg/m      Pelvic Exam:   EG/BUS: normal genital architecture without lesions, erythema or abnormal secretions.   Vagina: moist, pink, rugae with physiologic discharge and secretions  Cervix: nulliparous no lesions and pink, moist, closed, without lesion or CMT    PROCEDURE NOTE: -- IUD Insertion    Reason for Insertion: contraception    Premedicated with ibuprofen.  Under sterile technique, cervix was visualized with speculum and prepped with Betadine solution swab x 3. Tenaculum was placed for stability. The uterus was gently straightened and sounding was attempted, but sound " was unable to be placed beyond the internal os under ultrasound guidance. A paracervical block was then performed with 20 mL of 1% lidocaine. Additional attempts with an os finder and Russo dilators were attempted to dilate the cervix with minimal success. Sound was able to be briefly passed, so IUD insertion was attempted. Unfortunately, Mirena IUD unable to be inserted in applicator beyond the internal os. After multiple attempts, decision was made to abort the procedure. Tenaculum was removed and hemostasis noted. Speculum removed.  Patient tolerated procedure well.    EBL: minimal    Complications: none    Further discussion was then had with the patient regarding next steps for contraception. Discussed alternative forms of contraception as well as IUD placement in the OR under sedation. After discussion of risks and benefits, patient desires Nexplanon insertion. Able to add patient on to Dr. Duvall's schedule for Nexplanon placement today. Please see remaining note for details. Dr. Briscoe present for the duration of attempted IUD placement.    Aylin Peñaloza MD  OB/GYN Resident, PGY-4    Preoperative Diagnosis: Desires long-acting reversible contraception    Postoperative Diagnosis: Desired long-acting reversible contraception, s/p Nexplanon placement     Consent: Risks, benefits of treatment, and treatment were discussed. Patient's questions were elicited and answered. Written consent signed and scanned into medical record. Patient received and verbalized understanding of discharge instructions    Skin Preparation: Betadine     Anesthesia: 1 mL 1% lidocaine without EPI; 2 mL 1% lidocaine with Epi    Technique: Patient was consented for Nexplanon placement. The patient's left arm was flexed and externally rotated with her wrist parallel to her ear. The groove between her biceps and triceps was palpated. An insertion osei was made 8 cm from the medial epicondyle of the elbow around 2 cm beneath the groove. A  guiding osei was also made to assist with positioning Implanon correctly at time of insertion. The 2 cc of 1% lidocaine with epinephrine were then injected along the line of insertion of the Nexplanon from the insertion osei to the guiding osei to provide local anesthesia. The patient had inadequate anesthesia with that, and so 1 mL of 1% lidocaine without epinephrine was used at the insertion site. The tip of the Nexplanon applicator was then inserted at a 45  degree angle into the nick at the insertion osei and the needle was advanced the full length keeping the skin tented during insertion. The applicator seal was broken and the cannula was then retracted. The patient's arm was examined and the Nexplanon elisa was easily palpated. The patient also palpated her arm and was able to appreciate the presence of the elisa. Pressure was held at the insertion osei. A steri-strip was placed over the incision. A band-aid was placed over the steri-strip. A pressure bandage was then placed using Kerlix gauze. The patient tolerated the procedure very well.   EBL: <5 mL   Complications: None   Nexplanon Lot#: [id number]; Exp: 2/2027  Procedure supervised by Dr. Luis Felipe Duvall MD PGY-1  02/07/25 2:55 PM   OBGYN Attending Attestation    Bisi Prasad was seen by the resident, Anna Peñaloza and Jadiel in Continuity of Care Clinic. I, Evan Briscoe MD, reviewed the history and directly supervised the examination and procedure. The assessment and plan were made jointly between myself and Anna Berrios.    Evan Briscoe MD    [facility]  Obstetrics, Gynecology, and Women's Health  12:25 PM 02/08/2025            Again, thank you for allowing me to participate in the care of your patient.      Sincerely,    Jasmin Duvall MD

## 2025-02-07 NOTE — PROGRESS NOTES
Preoperative Diagnosis: Desires long-acting reversible contraception     Postoperative Diagnosis: Desired long-acting reversible contraception, s/p Nexplanon placement     Consent: Risks, benefits of treatment, and treatment were discussed. Patient's questions were elicited and answered. Written consent signed and scanned into medical record. Patient received and verbalized understanding of discharge instructions    Skin Preparation: Betadine     Anesthesia: 1 mL 1% lidocaine without EPI; 2 mL 1% lidocaine with Epi    Technique: Patient was consented for Nexplanon placement. The patient's left arm was flexed and externally rotated with her wrist parallel to her ear. The groove between her biceps and triceps was palpated. An insertion osei was made 8 cm from the medial epicondyle of the elbow around 2 cm beneath the groove. A guiding osei was also made to assist with positioning Implanon correctly at time of insertion. The 2 cc of 1% lidocaine with epinephrine were then injected along the line of insertion of the Nexplanon from the insertion osei to the guiding osei to provide local anesthesia. The patient had inadequate anesthesia with that, and so 1 mL of 1% lidocaine without epinephrine was used at the insertion site. The tip of the Nexplanon applicator was then inserted at a 45  degree angle into the nick at the insertion osei and the needle was advanced the full length keeping the skin tented during insertion. The applicator seal was broken and the cannula was then retracted. The patient's arm was examined and the Nexplanon elisa was easily palpated. The patient also palpated her arm and was able to appreciate the presence of the elisa. Pressure was held at the insertion osei. A steri-strip was placed over the incision. A band-aid was placed over the steri-strip. A pressure bandage was then placed using Kerlix gauze. The patient tolerated the procedure very well.   EBL: <5 mL   Complications: None   Nexplanon Lot#:  [id number]; Exp: 2/2027  Procedure supervised by Dr. Luis Felipe Duvall MD PGY-1  02/07/25 2:55 PM    with patient

## 2025-02-17 ENCOUNTER — MYC REFILL (OUTPATIENT)
Dept: FAMILY MEDICINE | Facility: CLINIC | Age: 30
End: 2025-02-17
Payer: COMMERCIAL

## 2025-02-17 DIAGNOSIS — I10 BENIGN ESSENTIAL HYPERTENSION: ICD-10-CM

## 2025-02-18 NOTE — TELEPHONE ENCOUNTER
Clinic RN: Please investigate patient's chart or contact patient if the information cannot be found because patient should have run out of this medication on 10/10/2024. Confirm patient is taking this medication as prescribed. Document findings and route refill encounter to provider for approval or denial.  Dionne Blair RN, BSN  [facility]

## 2025-02-18 NOTE — TELEPHONE ENCOUNTER
Left message for patient to call [facility] back  When patient calls back please transfer to an RN  Thanks,  Yvette CARRANZA RN

## 2025-02-18 NOTE — TELEPHONE ENCOUNTER
Patient states she has been taking medication daily   May have had extra prescription when prescribed in 10/2023  Patient reports she has two days remaining of current Rx  Thanks,  Shirin HARE RN

## 2025-02-19 RX ORDER — LOSARTAN POTASSIUM 100 MG/1
100 TABLET ORAL DAILY
Qty: 90 TABLET | Refills: 0 | Status: SHIPPED | OUTPATIENT
Start: 2025-02-19

## 2025-02-19 NOTE — TELEPHONE ENCOUNTER
Called patient LVM To call and get scheduled for Annual Physical and BP Check   C[facility] Unit Coordinator

## 2025-02-19 NOTE — TELEPHONE ENCOUNTER
Filled medication for 3 months but she is due for a visit with me -   Has been over 1.5 years since I saw her and last clinic BP was elevated  Appt should be for BP check  Thanks  PN

## 2025-07-31 ENCOUNTER — PATIENT OUTREACH (OUTPATIENT)
Dept: CARE COORDINATION | Facility: CLINIC | Age: 30
End: 2025-07-31
Payer: COMMERCIAL

## (undated) RX ORDER — LIDOCAINE HYDROCHLORIDE 10 MG/ML
INJECTION, SOLUTION INFILTRATION; PERINEURAL
Status: DISPENSED
Start: 2025-02-07

## (undated) RX ORDER — LIDOCAINE HYDROCHLORIDE AND EPINEPHRINE 10; 10 MG/ML; UG/ML
INJECTION, SOLUTION INFILTRATION; PERINEURAL
Status: DISPENSED
Start: 2025-02-07